# Patient Record
Sex: FEMALE | Race: WHITE | NOT HISPANIC OR LATINO
[De-identification: names, ages, dates, MRNs, and addresses within clinical notes are randomized per-mention and may not be internally consistent; named-entity substitution may affect disease eponyms.]

---

## 2017-01-18 ENCOUNTER — OTHER (OUTPATIENT)
Age: 62
End: 2017-01-18

## 2017-01-18 DIAGNOSIS — Z12.39 ENCOUNTER FOR OTHER SCREENING FOR MALIGNANT NEOPLASM OF BREAST: ICD-10-CM

## 2017-01-19 ENCOUNTER — FORM ENCOUNTER (OUTPATIENT)
Age: 62
End: 2017-01-19

## 2017-01-20 ENCOUNTER — OUTPATIENT (OUTPATIENT)
Dept: OUTPATIENT SERVICES | Facility: HOSPITAL | Age: 62
LOS: 1 days | End: 2017-01-20
Payer: MEDICAID

## 2017-01-20 PROCEDURE — 73560 X-RAY EXAM OF KNEE 1 OR 2: CPT | Mod: 26,LT

## 2017-01-20 PROCEDURE — 73560 X-RAY EXAM OF KNEE 1 OR 2: CPT

## 2017-01-24 ENCOUNTER — APPOINTMENT (OUTPATIENT)
Dept: INTERNAL MEDICINE | Facility: CLINIC | Age: 62
End: 2017-01-24

## 2017-01-24 VITALS
WEIGHT: 293 LBS | TEMPERATURE: 97.8 F | BODY MASS INDEX: 61.28 KG/M2 | DIASTOLIC BLOOD PRESSURE: 67 MMHG | SYSTOLIC BLOOD PRESSURE: 137 MMHG | HEART RATE: 76 BPM

## 2017-01-25 ENCOUNTER — RESULT REVIEW (OUTPATIENT)
Age: 62
End: 2017-01-25

## 2017-01-25 LAB
CHOLEST SERPL-MCNC: 198 MG/DL
CHOLEST/HDLC SERPL: 3.7 RATIO
HBA1C MFR BLD HPLC: 5.6 %
HDLC SERPL-MCNC: 54 MG/DL
LDLC SERPL CALC-MCNC: 125 MG/DL
TRIGL SERPL-MCNC: 93 MG/DL

## 2017-02-06 ENCOUNTER — APPOINTMENT (OUTPATIENT)
Dept: HEART AND VASCULAR | Facility: CLINIC | Age: 62
End: 2017-02-06

## 2017-02-06 VITALS — SYSTOLIC BLOOD PRESSURE: 160 MMHG | DIASTOLIC BLOOD PRESSURE: 100 MMHG

## 2017-02-06 VITALS
WEIGHT: 293 LBS | BODY MASS INDEX: 50.02 KG/M2 | SYSTOLIC BLOOD PRESSURE: 170 MMHG | HEART RATE: 76 BPM | HEIGHT: 64 IN | DIASTOLIC BLOOD PRESSURE: 100 MMHG

## 2017-02-09 LAB
ALBUMIN SERPL ELPH-MCNC: 4.2 G/DL
ALP BLD-CCNC: 96 U/L
ALT SERPL-CCNC: 25 U/L
ANION GAP SERPL CALC-SCNC: 19 MMOL/L
APPEARANCE: CLEAR
AST SERPL-CCNC: 18 U/L
BACTERIA: ABNORMAL
BASOPHILS # BLD AUTO: 0.04 K/UL
BASOPHILS NFR BLD AUTO: 0.5 %
BILIRUB SERPL-MCNC: 0.5 MG/DL
BILIRUBIN URINE: NEGATIVE
BLOOD URINE: NEGATIVE
BUN SERPL-MCNC: 17 MG/DL
CALCIUM OXALATE CRYSTALS: ABNORMAL
CALCIUM SERPL-MCNC: 9.8 MG/DL
CHLORIDE SERPL-SCNC: 105 MMOL/L
CO2 SERPL-SCNC: 20 MMOL/L
COLOR: ABNORMAL
CREAT SERPL-MCNC: 0.78 MG/DL
CREAT SPEC-SCNC: 499 MG/DL
EOSINOPHIL # BLD AUTO: 0.29 K/UL
EOSINOPHIL NFR BLD AUTO: 3.7 %
GLUCOSE QUALITATIVE U: NORMAL MG/DL
GLUCOSE SERPL-MCNC: 89 MG/DL
HCT VFR BLD CALC: 45.6 %
HGB BLD-MCNC: 14.5 G/DL
HYALINE CASTS: 0 /LPF
IMM GRANULOCYTES NFR BLD AUTO: 0.1 %
KETONES URINE: ABNORMAL
LEUKOCYTE ESTERASE URINE: NEGATIVE
LYMPHOCYTES # BLD AUTO: 1.84 K/UL
LYMPHOCYTES NFR BLD AUTO: 23.7 %
MAGNESIUM SERPL-MCNC: 2.2 MG/DL
MAN DIFF?: NORMAL
MCHC RBC-ENTMCNC: 29.8 PG
MCHC RBC-ENTMCNC: 31.8 GM/DL
MCV RBC AUTO: 93.8 FL
MICROALBUMIN 24H UR DL<=1MG/L-MCNC: 9 MG/DL
MICROALBUMIN/CREAT 24H UR-RTO: 18 UG/MG
MICROSCOPIC-UA: NORMAL
MONOCYTES # BLD AUTO: 0.4 K/UL
MONOCYTES NFR BLD AUTO: 5.1 %
NEUTROPHILS # BLD AUTO: 5.2 K/UL
NEUTROPHILS NFR BLD AUTO: 66.9 %
NITRITE URINE: NEGATIVE
PH URINE: 5.5
PLATELET # BLD AUTO: 251 K/UL
POTASSIUM SERPL-SCNC: 3.9 MMOL/L
PROT SERPL-MCNC: 7.3 G/DL
PROTEIN URINE: 30 MG/DL
RBC # BLD: 4.86 M/UL
RBC # FLD: 14.5 %
RED BLOOD CELLS URINE: 3 /HPF
SODIUM SERPL-SCNC: 144 MMOL/L
SPECIFIC GRAVITY URINE: 1.04
SQUAMOUS EPITHELIAL CELLS: 17 /HPF
TSH SERPL-ACNC: 4.03 UIU/ML
URINE COMMENTS: NORMAL
UROBILINOGEN URINE: 1 MG/DL
WBC # FLD AUTO: 7.78 K/UL
WHITE BLOOD CELLS URINE: 3 /HPF

## 2017-02-26 ENCOUNTER — FORM ENCOUNTER (OUTPATIENT)
Age: 62
End: 2017-02-26

## 2017-02-27 ENCOUNTER — OUTPATIENT (OUTPATIENT)
Dept: OUTPATIENT SERVICES | Facility: HOSPITAL | Age: 62
LOS: 1 days | End: 2017-02-27
Payer: MEDICAID

## 2017-02-27 PROCEDURE — 77067 SCR MAMMO BI INCL CAD: CPT

## 2017-02-27 PROCEDURE — G0202: CPT | Mod: 26

## 2017-03-03 ENCOUNTER — OUTPATIENT (OUTPATIENT)
Dept: OUTPATIENT SERVICES | Facility: HOSPITAL | Age: 62
LOS: 1 days | End: 2017-03-03
Payer: MEDICAID

## 2017-03-03 DIAGNOSIS — R55 SYNCOPE AND COLLAPSE: ICD-10-CM

## 2017-03-03 PROCEDURE — 93306 TTE W/DOPPLER COMPLETE: CPT | Mod: 26

## 2017-03-03 PROCEDURE — 93306 TTE W/DOPPLER COMPLETE: CPT

## 2017-03-06 ENCOUNTER — APPOINTMENT (OUTPATIENT)
Dept: HEART AND VASCULAR | Facility: CLINIC | Age: 62
End: 2017-03-06

## 2017-03-06 VITALS
WEIGHT: 293 LBS | BODY MASS INDEX: 50.02 KG/M2 | HEIGHT: 64 IN | SYSTOLIC BLOOD PRESSURE: 140 MMHG | DIASTOLIC BLOOD PRESSURE: 80 MMHG

## 2017-03-07 DIAGNOSIS — Z80.3 FAMILY HISTORY OF MALIGNANT NEOPLASM OF BREAST: ICD-10-CM

## 2017-03-07 DIAGNOSIS — Z12.31 ENCOUNTER FOR SCREENING MAMMOGRAM FOR MALIGNANT NEOPLASM OF BREAST: ICD-10-CM

## 2017-03-22 ENCOUNTER — APPOINTMENT (OUTPATIENT)
Dept: INTERNAL MEDICINE | Facility: CLINIC | Age: 62
End: 2017-03-22

## 2017-03-23 LAB
MEV IGG FLD QL IA: >300 AU/ML
MEV IGG+IGM SER-IMP: POSITIVE
MUV AB SER-ACNC: POSITIVE
MUV IGG SER QL IA: >300 AU/ML

## 2017-03-24 ENCOUNTER — APPOINTMENT (OUTPATIENT)
Dept: INTERNAL MEDICINE | Facility: CLINIC | Age: 62
End: 2017-03-24

## 2017-03-24 LAB
RUBV IGG FLD-ACNC: 22.4 INDEX
RUBV IGG SER-IMP: POSITIVE

## 2017-03-27 ENCOUNTER — APPOINTMENT (OUTPATIENT)
Dept: HEART AND VASCULAR | Facility: CLINIC | Age: 62
End: 2017-03-27

## 2017-03-27 VITALS
HEIGHT: 64 IN | SYSTOLIC BLOOD PRESSURE: 132 MMHG | HEART RATE: 69 BPM | WEIGHT: 293 LBS | BODY MASS INDEX: 50.02 KG/M2 | DIASTOLIC BLOOD PRESSURE: 80 MMHG

## 2017-03-27 VITALS — DIASTOLIC BLOOD PRESSURE: 88 MMHG | HEART RATE: 62 BPM | SYSTOLIC BLOOD PRESSURE: 190 MMHG

## 2017-03-27 LAB
AMPHET UR-MCNC: NEGATIVE
BARBITURATES UR-MCNC: NEGATIVE
BENZODIAZ UR-MCNC: NEGATIVE
COCAINE METAB.OTHER UR-MCNC: NEGATIVE
CREATININE, URINE: >200 MG/DL
METHADONE UR-MCNC: NEGATIVE
METHAQUALONE UR-MCNC: NEGATIVE
OPIATES UR-MCNC: NEGATIVE
PCP UR-MCNC: NEGATIVE
PROPOXYPH UR QL: NEGATIVE
THC UR QL: NEGATIVE

## 2017-03-30 ENCOUNTER — APPOINTMENT (OUTPATIENT)
Dept: HEART AND VASCULAR | Facility: CLINIC | Age: 62
End: 2017-03-30

## 2017-03-30 VITALS
HEART RATE: 69 BPM | DIASTOLIC BLOOD PRESSURE: 82 MMHG | BODY MASS INDEX: 50.02 KG/M2 | HEIGHT: 64 IN | SYSTOLIC BLOOD PRESSURE: 122 MMHG | WEIGHT: 293 LBS

## 2017-04-03 ENCOUNTER — APPOINTMENT (OUTPATIENT)
Dept: INTERNAL MEDICINE | Facility: CLINIC | Age: 62
End: 2017-04-03

## 2017-04-03 VITALS — DIASTOLIC BLOOD PRESSURE: 82 MMHG | SYSTOLIC BLOOD PRESSURE: 124 MMHG

## 2017-04-03 VITALS — OXYGEN SATURATION: 97 % | BODY MASS INDEX: 58.02 KG/M2 | WEIGHT: 293 LBS | HEART RATE: 77 BPM

## 2017-04-03 DIAGNOSIS — Z76.89 PERSONS ENCOUNTERING HEALTH SERVICES IN OTHER SPECIFIED CIRCUMSTANCES: ICD-10-CM

## 2017-04-03 LAB — GLUCOSE BLDC GLUCOMTR-MCNC: 85

## 2017-04-04 LAB
CHOLEST SERPL-MCNC: 170 MG/DL
CHOLEST/HDLC SERPL: 3.3 RATIO
HDLC SERPL-MCNC: 52 MG/DL
LDLC SERPL CALC-MCNC: 97 MG/DL
TRIGL SERPL-MCNC: 107 MG/DL

## 2017-05-09 ENCOUNTER — APPOINTMENT (OUTPATIENT)
Dept: HEART AND VASCULAR | Facility: CLINIC | Age: 62
End: 2017-05-09

## 2017-05-10 ENCOUNTER — APPOINTMENT (OUTPATIENT)
Dept: INTERNAL MEDICINE | Facility: CLINIC | Age: 62
End: 2017-05-10

## 2017-05-12 ENCOUNTER — APPOINTMENT (OUTPATIENT)
Dept: ENDOCRINOLOGY | Facility: CLINIC | Age: 62
End: 2017-05-12

## 2017-05-24 ENCOUNTER — OUTPATIENT (OUTPATIENT)
Dept: OUTPATIENT SERVICES | Facility: HOSPITAL | Age: 62
LOS: 1 days | End: 2017-05-24
Payer: MEDICAID

## 2017-05-24 PROCEDURE — 75574 CT ANGIO HRT W/3D IMAGE: CPT | Mod: 26

## 2017-05-24 PROCEDURE — 75574 CT ANGIO HRT W/3D IMAGE: CPT

## 2017-06-06 ENCOUNTER — APPOINTMENT (OUTPATIENT)
Dept: INTERNAL MEDICINE | Facility: CLINIC | Age: 62
End: 2017-06-06

## 2017-06-07 ENCOUNTER — APPOINTMENT (OUTPATIENT)
Dept: NEUROLOGY | Facility: CLINIC | Age: 62
End: 2017-06-07

## 2017-06-07 VITALS
HEART RATE: 73 BPM | TEMPERATURE: 98.4 F | DIASTOLIC BLOOD PRESSURE: 89 MMHG | HEIGHT: 63.5 IN | OXYGEN SATURATION: 94 % | BODY MASS INDEX: 51.27 KG/M2 | SYSTOLIC BLOOD PRESSURE: 139 MMHG | WEIGHT: 293 LBS

## 2017-06-07 DIAGNOSIS — Z87.891 PERSONAL HISTORY OF NICOTINE DEPENDENCE: ICD-10-CM

## 2017-06-08 LAB
BASOPHILS # BLD AUTO: 0.03 K/UL
BASOPHILS NFR BLD AUTO: 0.4 %
EOSINOPHIL # BLD AUTO: 0.29 K/UL
EOSINOPHIL NFR BLD AUTO: 4 %
HCT VFR BLD CALC: 46.6 %
HGB BLD-MCNC: 14.8 G/DL
IMM GRANULOCYTES NFR BLD AUTO: 0.1 %
LYMPHOCYTES # BLD AUTO: 2.06 K/UL
LYMPHOCYTES NFR BLD AUTO: 28.1 %
MAN DIFF?: NORMAL
MCHC RBC-ENTMCNC: 30.2 PG
MCHC RBC-ENTMCNC: 31.8 GM/DL
MCV RBC AUTO: 95.1 FL
MONOCYTES # BLD AUTO: 0.35 K/UL
MONOCYTES NFR BLD AUTO: 4.8 %
NEUTROPHILS # BLD AUTO: 4.6 K/UL
NEUTROPHILS NFR BLD AUTO: 62.6 %
PLATELET # BLD AUTO: 235 K/UL
RBC # BLD: 4.9 M/UL
RBC # FLD: 14.3 %
TSH SERPL-ACNC: 4.51 UIU/ML
VIT B12 SERPL-MCNC: 1603 PG/ML
WBC # FLD AUTO: 7.34 K/UL

## 2017-06-13 ENCOUNTER — APPOINTMENT (OUTPATIENT)
Dept: ENDOCRINOLOGY | Facility: CLINIC | Age: 62
End: 2017-06-13

## 2017-06-14 ENCOUNTER — APPOINTMENT (OUTPATIENT)
Dept: NEUROLOGY | Facility: CLINIC | Age: 62
End: 2017-06-14

## 2017-06-19 ENCOUNTER — APPOINTMENT (OUTPATIENT)
Dept: INTERNAL MEDICINE | Facility: CLINIC | Age: 62
End: 2017-06-19

## 2017-06-19 VITALS
OXYGEN SATURATION: 97 % | SYSTOLIC BLOOD PRESSURE: 119 MMHG | HEART RATE: 80 BPM | HEIGHT: 63.5 IN | BODY MASS INDEX: 51.27 KG/M2 | WEIGHT: 293 LBS | TEMPERATURE: 97.8 F | DIASTOLIC BLOOD PRESSURE: 75 MMHG

## 2017-06-20 ENCOUNTER — APPOINTMENT (OUTPATIENT)
Dept: NEUROLOGY | Facility: CLINIC | Age: 62
End: 2017-06-20

## 2017-06-22 ENCOUNTER — APPOINTMENT (OUTPATIENT)
Dept: NEUROLOGY | Facility: CLINIC | Age: 62
End: 2017-06-22

## 2017-06-22 LAB
HBA1C MFR BLD HPLC: 5.4 %
T4 FREE SERPL-MCNC: 1.2 NG/DL

## 2017-07-17 ENCOUNTER — APPOINTMENT (OUTPATIENT)
Dept: GASTROENTEROLOGY | Facility: CLINIC | Age: 62
End: 2017-07-17

## 2017-07-17 ENCOUNTER — FORM ENCOUNTER (OUTPATIENT)
Age: 62
End: 2017-07-17

## 2017-07-18 ENCOUNTER — APPOINTMENT (OUTPATIENT)
Dept: ORTHOPEDIC SURGERY | Facility: CLINIC | Age: 62
End: 2017-07-18

## 2017-07-18 ENCOUNTER — OUTPATIENT (OUTPATIENT)
Dept: OUTPATIENT SERVICES | Facility: HOSPITAL | Age: 62
LOS: 1 days | End: 2017-07-18
Payer: MEDICAID

## 2017-07-18 VITALS
DIASTOLIC BLOOD PRESSURE: 80 MMHG | BODY MASS INDEX: 51.27 KG/M2 | SYSTOLIC BLOOD PRESSURE: 120 MMHG | WEIGHT: 293 LBS | HEIGHT: 63.5 IN

## 2017-07-18 DIAGNOSIS — Z80.1 FAMILY HISTORY OF MALIGNANT NEOPLASM OF TRACHEA, BRONCHUS AND LUNG: ICD-10-CM

## 2017-07-18 DIAGNOSIS — Z80.3 FAMILY HISTORY OF MALIGNANT NEOPLASM OF BREAST: ICD-10-CM

## 2017-07-18 DIAGNOSIS — Z82.62 FAMILY HISTORY OF OSTEOPOROSIS: ICD-10-CM

## 2017-07-18 DIAGNOSIS — Z82.49 FAMILY HISTORY OF ISCHEMIC HEART DISEASE AND OTHER DISEASES OF THE CIRCULATORY SYSTEM: ICD-10-CM

## 2017-07-18 PROCEDURE — 72100 X-RAY EXAM L-S SPINE 2/3 VWS: CPT

## 2017-07-18 PROCEDURE — 72082 X-RAY EXAM ENTIRE SPI 2/3 VW: CPT | Mod: 26

## 2017-07-18 PROCEDURE — 72082 X-RAY EXAM ENTIRE SPI 2/3 VW: CPT

## 2017-07-21 ENCOUNTER — APPOINTMENT (OUTPATIENT)
Dept: NEUROLOGY | Facility: CLINIC | Age: 62
End: 2017-07-21

## 2017-07-21 VITALS
SYSTOLIC BLOOD PRESSURE: 120 MMHG | HEART RATE: 79 BPM | DIASTOLIC BLOOD PRESSURE: 76 MMHG | OXYGEN SATURATION: 96 % | TEMPERATURE: 98.2 F

## 2017-07-21 VITALS — BODY MASS INDEX: 51.27 KG/M2 | WEIGHT: 293 LBS | HEIGHT: 63.5 IN

## 2017-07-24 ENCOUNTER — RX RENEWAL (OUTPATIENT)
Age: 62
End: 2017-07-24

## 2017-07-26 ENCOUNTER — RX RENEWAL (OUTPATIENT)
Age: 62
End: 2017-07-26

## 2017-08-16 ENCOUNTER — FORM ENCOUNTER (OUTPATIENT)
Age: 62
End: 2017-08-16

## 2017-08-17 ENCOUNTER — APPOINTMENT (OUTPATIENT)
Dept: ORTHOPEDIC SURGERY | Facility: CLINIC | Age: 62
End: 2017-08-17
Payer: MEDICAID

## 2017-08-17 ENCOUNTER — OUTPATIENT (OUTPATIENT)
Dept: OUTPATIENT SERVICES | Facility: HOSPITAL | Age: 62
LOS: 1 days | End: 2017-08-17
Payer: MEDICAID

## 2017-08-17 VITALS — WEIGHT: 293 LBS | BODY MASS INDEX: 51.27 KG/M2 | HEIGHT: 63.5 IN

## 2017-08-17 DIAGNOSIS — E66.9 OBESITY, UNSPECIFIED: ICD-10-CM

## 2017-08-17 DIAGNOSIS — M16.11 UNILATERAL PRIMARY OSTEOARTHRITIS, RIGHT HIP: ICD-10-CM

## 2017-08-17 PROCEDURE — 73502 X-RAY EXAM HIP UNI 2-3 VIEWS: CPT | Mod: 26,RT

## 2017-08-17 PROCEDURE — 99215 OFFICE O/P EST HI 40 MIN: CPT

## 2017-08-17 PROCEDURE — 72020 X-RAY EXAM OF SPINE 1 VIEW: CPT

## 2017-08-17 PROCEDURE — 72020 X-RAY EXAM OF SPINE 1 VIEW: CPT | Mod: 26

## 2017-08-17 PROCEDURE — 73502 X-RAY EXAM HIP UNI 2-3 VIEWS: CPT

## 2017-08-20 ENCOUNTER — FORM ENCOUNTER (OUTPATIENT)
Age: 62
End: 2017-08-20

## 2017-08-21 ENCOUNTER — OUTPATIENT (OUTPATIENT)
Dept: OUTPATIENT SERVICES | Facility: HOSPITAL | Age: 62
LOS: 1 days | End: 2017-08-21
Payer: MEDICAID

## 2017-08-21 PROCEDURE — 93880 EXTRACRANIAL BILAT STUDY: CPT

## 2017-08-21 PROCEDURE — 93880 EXTRACRANIAL BILAT STUDY: CPT | Mod: 26

## 2017-08-25 ENCOUNTER — OTHER (OUTPATIENT)
Age: 62
End: 2017-08-25

## 2017-09-01 ENCOUNTER — APPOINTMENT (OUTPATIENT)
Dept: ENDOCRINOLOGY | Facility: CLINIC | Age: 62
End: 2017-09-01
Payer: MEDICAID

## 2017-09-18 ENCOUNTER — APPOINTMENT (OUTPATIENT)
Dept: NEUROLOGY | Facility: CLINIC | Age: 62
End: 2017-09-18
Payer: MEDICAID

## 2017-09-18 VITALS
HEIGHT: 63.5 IN | WEIGHT: 293 LBS | TEMPERATURE: 98.1 F | BODY MASS INDEX: 51.27 KG/M2 | SYSTOLIC BLOOD PRESSURE: 143 MMHG | HEART RATE: 74 BPM | OXYGEN SATURATION: 94 % | DIASTOLIC BLOOD PRESSURE: 95 MMHG

## 2017-09-18 DIAGNOSIS — Z87.898 PERSONAL HISTORY OF OTHER SPECIFIED CONDITIONS: ICD-10-CM

## 2017-09-18 DIAGNOSIS — R55 SYNCOPE AND COLLAPSE: ICD-10-CM

## 2017-09-18 PROCEDURE — 99214 OFFICE O/P EST MOD 30 MIN: CPT

## 2017-09-20 ENCOUNTER — APPOINTMENT (OUTPATIENT)
Dept: ENDOCRINOLOGY | Facility: CLINIC | Age: 62
End: 2017-09-20
Payer: MEDICAID

## 2017-09-20 VITALS — WEIGHT: 293 LBS | BODY MASS INDEX: 51.27 KG/M2 | HEIGHT: 63.5 IN

## 2017-09-20 DIAGNOSIS — G47.33 OBSTRUCTIVE SLEEP APNEA (ADULT) (PEDIATRIC): ICD-10-CM

## 2017-09-20 DIAGNOSIS — Z86.59 PERSONAL HISTORY OF OTHER MENTAL AND BEHAVIORAL DISORDERS: ICD-10-CM

## 2017-09-20 DIAGNOSIS — E78.5 HYPERLIPIDEMIA, UNSPECIFIED: ICD-10-CM

## 2017-09-20 DIAGNOSIS — R73.09 OTHER ABNORMAL GLUCOSE: ICD-10-CM

## 2017-09-20 PROCEDURE — 99205 OFFICE O/P NEW HI 60 MIN: CPT | Mod: GC

## 2017-09-25 ENCOUNTER — APPOINTMENT (OUTPATIENT)
Dept: GASTROENTEROLOGY | Facility: CLINIC | Age: 62
End: 2017-09-25

## 2017-09-28 ENCOUNTER — APPOINTMENT (OUTPATIENT)
Dept: HEART AND VASCULAR | Facility: CLINIC | Age: 62
End: 2017-09-28
Payer: MEDICAID

## 2017-09-28 VITALS
HEART RATE: 70 BPM | SYSTOLIC BLOOD PRESSURE: 139 MMHG | DIASTOLIC BLOOD PRESSURE: 80 MMHG | BODY MASS INDEX: 53.18 KG/M2 | WEIGHT: 293 LBS

## 2017-09-28 VITALS — DIASTOLIC BLOOD PRESSURE: 85 MMHG | SYSTOLIC BLOOD PRESSURE: 135 MMHG | HEART RATE: 72 BPM

## 2017-09-28 PROCEDURE — 99214 OFFICE O/P EST MOD 30 MIN: CPT

## 2017-10-03 LAB
25(OH)D3 SERPL-MCNC: 62.9 NG/ML
ALBUMIN SERPL ELPH-MCNC: 4.4 G/DL
ALP BLD-CCNC: 113 U/L
ALT SERPL-CCNC: 26 U/L
ANION GAP SERPL CALC-SCNC: 19 MMOL/L
AST SERPL-CCNC: 22 U/L
BILIRUB SERPL-MCNC: 1.1 MG/DL
BUN SERPL-MCNC: 15 MG/DL
C PEPTIDE SERPL-MCNC: 4.8 NG/ML
CALCIUM SERPL-MCNC: 10.5 MG/DL
CHLORIDE SERPL-SCNC: 103 MMOL/L
CHOLEST SERPL-MCNC: 170 MG/DL
CHOLEST/HDLC SERPL: 3.1 RATIO
CO2 SERPL-SCNC: 22 MMOL/L
CREAT SERPL-MCNC: 1.05 MG/DL
GLUCOSE SERPL-MCNC: 87 MG/DL
HDLC SERPL-MCNC: 54 MG/DL
INSULIN SERPL-MCNC: 10.6 UU/ML
LDLC SERPL CALC-MCNC: 99 MG/DL
POTASSIUM SERPL-SCNC: 4.2 MMOL/L
PROINSULIN SERPL-MCNC: 6.3 PMOL/L
PROT SERPL-MCNC: 7.5 G/DL
SODIUM SERPL-SCNC: 144 MMOL/L
T3 SERPL-MCNC: 108 NG/DL
T4 FREE SERPL-MCNC: 1.5 NG/DL
THYROGLOB AB SERPL-ACNC: <20 IU/ML
THYROPEROXIDASE AB SERPL IA-ACNC: 59.9 IU/ML
TRIGL SERPL-MCNC: 87 MG/DL
TSH SERPL-ACNC: 3.78 UIU/ML

## 2017-10-16 ENCOUNTER — APPOINTMENT (OUTPATIENT)
Dept: SLEEP CENTER | Facility: HOSPITAL | Age: 62
End: 2017-10-16

## 2017-10-16 ENCOUNTER — APPOINTMENT (OUTPATIENT)
Dept: HEART AND VASCULAR | Facility: CLINIC | Age: 62
End: 2017-10-16

## 2017-10-19 ENCOUNTER — APPOINTMENT (OUTPATIENT)
Dept: INTERNAL MEDICINE | Facility: CLINIC | Age: 62
End: 2017-10-19

## 2017-10-31 ENCOUNTER — RX RENEWAL (OUTPATIENT)
Age: 62
End: 2017-10-31

## 2017-11-13 ENCOUNTER — APPOINTMENT (OUTPATIENT)
Dept: INTERNAL MEDICINE | Facility: CLINIC | Age: 62
End: 2017-11-13

## 2017-11-14 ENCOUNTER — APPOINTMENT (OUTPATIENT)
Dept: INTERNAL MEDICINE | Facility: CLINIC | Age: 62
End: 2017-11-14
Payer: MEDICAID

## 2017-11-14 VITALS
HEART RATE: 67 BPM | WEIGHT: 293 LBS | DIASTOLIC BLOOD PRESSURE: 87 MMHG | BODY MASS INDEX: 53.15 KG/M2 | TEMPERATURE: 98.5 F | SYSTOLIC BLOOD PRESSURE: 129 MMHG | OXYGEN SATURATION: 95 %

## 2017-11-14 DIAGNOSIS — M51.16 INTERVERTEBRAL DISC DISORDERS WITH RADICULOPATHY, LUMBAR REGION: ICD-10-CM

## 2017-11-14 DIAGNOSIS — E04.1 NONTOXIC SINGLE THYROID NODULE: ICD-10-CM

## 2017-11-14 PROCEDURE — 99214 OFFICE O/P EST MOD 30 MIN: CPT | Mod: 25,GC

## 2017-11-21 ENCOUNTER — RX RENEWAL (OUTPATIENT)
Age: 62
End: 2017-11-21

## 2017-11-22 ENCOUNTER — RX RENEWAL (OUTPATIENT)
Age: 62
End: 2017-11-22

## 2017-11-22 ENCOUNTER — OUTPATIENT (OUTPATIENT)
Dept: OUTPATIENT SERVICES | Facility: HOSPITAL | Age: 62
LOS: 1 days | End: 2017-11-22
Payer: MEDICAID

## 2017-11-22 PROCEDURE — 76536 US EXAM OF HEAD AND NECK: CPT | Mod: 26

## 2017-11-22 PROCEDURE — 76536 US EXAM OF HEAD AND NECK: CPT

## 2017-11-29 ENCOUNTER — APPOINTMENT (OUTPATIENT)
Dept: INTERNAL MEDICINE | Facility: CLINIC | Age: 62
End: 2017-11-29
Payer: COMMERCIAL

## 2017-11-29 VITALS
TEMPERATURE: 98.4 F | DIASTOLIC BLOOD PRESSURE: 77 MMHG | SYSTOLIC BLOOD PRESSURE: 119 MMHG | WEIGHT: 293 LBS | BODY MASS INDEX: 53.32 KG/M2 | OXYGEN SATURATION: 94 % | HEART RATE: 74 BPM

## 2017-11-29 DIAGNOSIS — I25.10 ATHEROSCLEROTIC HEART DISEASE OF NATIVE CORONARY ARTERY W/OUT ANGINA PECTORIS: ICD-10-CM

## 2017-11-29 DIAGNOSIS — Z02.89 ENCOUNTER FOR OTHER ADMINISTRATIVE EXAMINATIONS: ICD-10-CM

## 2017-11-29 PROCEDURE — 99213 OFFICE O/P EST LOW 20 MIN: CPT | Mod: GC

## 2017-12-21 ENCOUNTER — APPOINTMENT (OUTPATIENT)
Dept: INTERNAL MEDICINE | Facility: CLINIC | Age: 62
End: 2017-12-21

## 2018-01-12 ENCOUNTER — OTHER (OUTPATIENT)
Age: 63
End: 2018-01-12

## 2018-02-06 ENCOUNTER — RX RENEWAL (OUTPATIENT)
Age: 63
End: 2018-02-06

## 2018-03-06 ENCOUNTER — RX RENEWAL (OUTPATIENT)
Age: 63
End: 2018-03-06

## 2018-03-07 ENCOUNTER — APPOINTMENT (OUTPATIENT)
Dept: INTERNAL MEDICINE | Facility: CLINIC | Age: 63
End: 2018-03-07

## 2018-05-02 ENCOUNTER — APPOINTMENT (OUTPATIENT)
Dept: INTERNAL MEDICINE | Facility: CLINIC | Age: 63
End: 2018-05-02

## 2018-06-11 ENCOUNTER — APPOINTMENT (OUTPATIENT)
Dept: NEUROLOGY | Facility: CLINIC | Age: 63
End: 2018-06-11

## 2018-07-17 ENCOUNTER — RX CHANGE (OUTPATIENT)
Age: 63
End: 2018-07-17

## 2018-08-03 ENCOUNTER — RX RENEWAL (OUTPATIENT)
Age: 63
End: 2018-08-03

## 2018-08-09 ENCOUNTER — APPOINTMENT (OUTPATIENT)
Dept: HEART AND VASCULAR | Facility: CLINIC | Age: 63
End: 2018-08-09

## 2018-08-20 ENCOUNTER — APPOINTMENT (OUTPATIENT)
Dept: INTERNAL MEDICINE | Facility: CLINIC | Age: 63
End: 2018-08-20

## 2018-09-17 ENCOUNTER — APPOINTMENT (OUTPATIENT)
Dept: HEART AND VASCULAR | Facility: CLINIC | Age: 63
End: 2018-09-17
Payer: COMMERCIAL

## 2018-09-17 VITALS
WEIGHT: 293 LBS | HEIGHT: 63 IN | SYSTOLIC BLOOD PRESSURE: 110 MMHG | BODY MASS INDEX: 51.91 KG/M2 | HEART RATE: 77 BPM | DIASTOLIC BLOOD PRESSURE: 78 MMHG

## 2018-09-17 PROCEDURE — 93000 ELECTROCARDIOGRAM COMPLETE: CPT

## 2018-09-17 PROCEDURE — 99215 OFFICE O/P EST HI 40 MIN: CPT | Mod: 25

## 2018-09-17 PROCEDURE — 81005 URINALYSIS: CPT

## 2018-09-17 PROCEDURE — 82043 UR ALBUMIN QUANTITATIVE: CPT | Mod: QW

## 2018-09-17 PROCEDURE — 36415 COLL VENOUS BLD VENIPUNCTURE: CPT

## 2018-09-18 ENCOUNTER — APPOINTMENT (OUTPATIENT)
Dept: INTERNAL MEDICINE | Facility: CLINIC | Age: 63
End: 2018-09-18

## 2018-09-19 LAB
ALBUMIN SERPL ELPH-MCNC: 4.5 G/DL
ALP BLD-CCNC: 119 U/L
ALT SERPL-CCNC: 22 U/L
ANION GAP SERPL CALC-SCNC: 15 MMOL/L
AST SERPL-CCNC: 19 U/L
BASOPHILS # BLD AUTO: 0.05 K/UL
BASOPHILS NFR BLD AUTO: 0.9 %
BILIRUB SERPL-MCNC: 0.8 MG/DL
BUN SERPL-MCNC: 20 MG/DL
CALCIUM SERPL-MCNC: 10.5 MG/DL
CHLORIDE SERPL-SCNC: 105 MMOL/L
CHOLEST SERPL-MCNC: 144 MG/DL
CHOLEST/HDLC SERPL: 2.7 RATIO
CO2 SERPL-SCNC: 26 MMOL/L
CREAT SERPL-MCNC: 1.09 MG/DL
EOSINOPHIL # BLD AUTO: 0.27 K/UL
EOSINOPHIL NFR BLD AUTO: 4.6 %
GLUCOSE SERPL-MCNC: 106 MG/DL
HBA1C MFR BLD HPLC: 5.4 %
HCT VFR BLD CALC: 46.8 %
HDLC SERPL-MCNC: 54 MG/DL
HGB BLD-MCNC: 15 G/DL
IMM GRANULOCYTES NFR BLD AUTO: 0.3 %
LDLC SERPL CALC-MCNC: 71 MG/DL
LYMPHOCYTES # BLD AUTO: 1.38 K/UL
LYMPHOCYTES NFR BLD AUTO: 23.5 %
MAGNESIUM SERPL-MCNC: 2.3 MG/DL
MAN DIFF?: NORMAL
MCHC RBC-ENTMCNC: 29.8 PG
MCHC RBC-ENTMCNC: 32.1 GM/DL
MCV RBC AUTO: 93 FL
MONOCYTES # BLD AUTO: 0.32 K/UL
MONOCYTES NFR BLD AUTO: 5.4 %
NEUTROPHILS # BLD AUTO: 3.84 K/UL
NEUTROPHILS NFR BLD AUTO: 65.3 %
PLATELET # BLD AUTO: 260 K/UL
POTASSIUM SERPL-SCNC: 4.5 MMOL/L
PROT SERPL-MCNC: 7.6 G/DL
RBC # BLD: 5.03 M/UL
RBC # FLD: 14 %
SODIUM SERPL-SCNC: 146 MMOL/L
TRIGL SERPL-MCNC: 96 MG/DL
TSH SERPL-ACNC: 5.99 UIU/ML
WBC # FLD AUTO: 5.88 K/UL

## 2018-10-12 ENCOUNTER — RX RENEWAL (OUTPATIENT)
Age: 63
End: 2018-10-12

## 2018-10-15 ENCOUNTER — RX RENEWAL (OUTPATIENT)
Age: 63
End: 2018-10-15

## 2018-11-14 ENCOUNTER — RX RENEWAL (OUTPATIENT)
Age: 63
End: 2018-11-14

## 2018-11-15 ENCOUNTER — APPOINTMENT (OUTPATIENT)
Dept: INTERNAL MEDICINE | Facility: CLINIC | Age: 63
End: 2018-11-15

## 2018-11-20 ENCOUNTER — APPOINTMENT (OUTPATIENT)
Dept: INTERNAL MEDICINE | Facility: CLINIC | Age: 63
End: 2018-11-20
Payer: COMMERCIAL

## 2018-11-20 VITALS
SYSTOLIC BLOOD PRESSURE: 140 MMHG | DIASTOLIC BLOOD PRESSURE: 80 MMHG | RESPIRATION RATE: 14 BRPM | BODY MASS INDEX: 51.91 KG/M2 | HEIGHT: 63 IN | HEART RATE: 80 BPM | WEIGHT: 293 LBS

## 2018-11-20 DIAGNOSIS — E66.01 MORBID (SEVERE) OBESITY DUE TO EXCESS CALORIES: ICD-10-CM

## 2018-11-20 DIAGNOSIS — R21 RASH AND OTHER NONSPECIFIC SKIN ERUPTION: ICD-10-CM

## 2018-11-20 DIAGNOSIS — F41.9 ANXIETY DISORDER, UNSPECIFIED: ICD-10-CM

## 2018-11-20 DIAGNOSIS — I10 ESSENTIAL (PRIMARY) HYPERTENSION: ICD-10-CM

## 2018-11-20 DIAGNOSIS — M25.562 PAIN IN LEFT KNEE: ICD-10-CM

## 2018-11-20 DIAGNOSIS — R79.89 OTHER SPECIFIED ABNORMAL FINDINGS OF BLOOD CHEMISTRY: ICD-10-CM

## 2018-11-20 DIAGNOSIS — H57.10 OCULAR PAIN, UNSPECIFIED EYE: ICD-10-CM

## 2018-11-20 DIAGNOSIS — F32.9 ANXIETY DISORDER, UNSPECIFIED: ICD-10-CM

## 2018-11-20 PROCEDURE — 36415 COLL VENOUS BLD VENIPUNCTURE: CPT

## 2018-11-20 PROCEDURE — 99214 OFFICE O/P EST MOD 30 MIN: CPT | Mod: GC,25

## 2018-11-20 RX ORDER — CHLORZOXAZONE 500 MG/1
500 TABLET ORAL
Qty: 30 | Refills: 0 | Status: DISCONTINUED | COMMUNITY
End: 2018-11-20

## 2018-11-21 ENCOUNTER — RESULT REVIEW (OUTPATIENT)
Age: 63
End: 2018-11-21

## 2018-11-21 PROBLEM — E66.01 OBESITY, MORBID (MORE THAN 100 LBS OVER IDEAL WEIGHT OR BMI > 40): Status: ACTIVE | Noted: 2017-09-20

## 2018-11-21 PROBLEM — M25.562 LEFT KNEE PAIN, UNSPECIFIED CHRONICITY: Status: ACTIVE | Noted: 2017-01-18

## 2018-11-21 PROBLEM — H57.10: Status: ACTIVE | Noted: 2018-08-03

## 2018-11-21 LAB
APPEARANCE: ABNORMAL
BACTERIA: NEGATIVE
BILIRUBIN URINE: ABNORMAL
BLOOD URINE: NEGATIVE
CALCIUM OXALATE CRYSTALS: ABNORMAL
COLOR: ABNORMAL
GLUCOSE QUALITATIVE U: NEGATIVE MG/DL
HYALINE CASTS: 0 /LPF
KETONES URINE: ABNORMAL
LEUKOCYTE ESTERASE URINE: NEGATIVE
MICROSCOPIC-UA: NORMAL
NITRITE URINE: NEGATIVE
PH URINE: 5.5
PROTEIN URINE: ABNORMAL MG/DL
RED BLOOD CELLS URINE: 0 /HPF
SPECIFIC GRAVITY URINE: 1.03
SQUAMOUS EPITHELIAL CELLS: 7 /HPF
T3 SERPL-MCNC: 112 NG/DL
T4 FREE SERPL-MCNC: 1.3 NG/DL
UROBILINOGEN URINE: NEGATIVE MG/DL
WHITE BLOOD CELLS URINE: 13 /HPF

## 2018-11-21 NOTE — ASSESSMENT
[FreeTextEntry1] : PMH of morbid obesity, HTN, HLD, CAD, prediabetes, thyroid nodule, who presents for complete physical exam\par \par 1. Creatinine elevation\par - Patient with elevated creatinine following twice daily use of NSAIDs\par - Discussed effect of NSAIDs on renal function and encouraged patient to use tylenol for general body pains\par - F/u creatinine at next visit\par - F/u UA\par \par 2. TSH elevation\par - Patient with normal T3/free T4 last year, seen by Dr. Haddad\par - With asymptomatic TSH elevation, will recheck T3/T4 at this visit\par \par 3. Skin rash\par - Patient with possible psoriasis vs fungal rash, involving elbows as well\par - Will trial steroid cream hydrocortisone 1%, can refer to dermatology if no improvement\par - Patient hesitant to use steroids\par \par 4. Eye problems\par - Patient reporting additional floaters, states that she "could see the macula" on her R eye\par - Refer to ophthalmology, will call [facility]\par - Less likely temporal arteritis as patient with no fever, no vision loss. Will f/u ESR\par - Patient reports that she had MRI, will obtain copy of report\par \par 5. Prediabetes\par - A1C last 5.4%, can recheck at next visit\par - Encourage diet and lifestyle modification\par - Continue to follow up with endocrinology\par - F/u UA\par \par 6. HTN\par - Order extra-large BP cuff / thigh cuff for patient\par \par 7. Depression\par - Patient will be set up with in-house psychologist Mona Hair for initial meeting today. Endorses depressed mood due to inability to participate in hobbies and activities due to medical comorbidity\par \par 8. HCM\par - Deferred in setting of multiple active medical problems\par - Patient refused flu shot\par - Will request RTC in 6 weeks to provide additional preventive care

## 2018-11-21 NOTE — HISTORY OF PRESENT ILLNESS
[de-identified] : Patient is a 63 year old female with PMH of morbid obesity, HTN, HLD, CAD, prediabetes, thyroid nodule, who presents for physical exam. She states that her cardiologist, Dr. Rivera, noted that she has elevated creatinine on outpatient labs - she took Ibuprofen heavily BID (unknown dose) a few months ago and switched to more occasional meloxicam PRN (approximately once per week). She takes this medication for diffuse pains throughout her body. She endorses eye problems with acute onset three months ago, when she felt like a layer of material was floating in her R eye, and she noticed "strings" of floaters. She has a R sided headache, and feels sinus pressure on the R; she is beginning to feel L sinus pressure as well. She has had recent dental work done with planing. In addition to this, she has had a rash on the back of her L hand which is improving over the past four months; she states that she picked up a book at the library and feels that this gave her a fungal infection. She has tried copper, almond oil, and collagen for the rash and it is helping somewhat. The rash is intermittently itchy, worse at night

## 2018-11-21 NOTE — REVIEW OF SYSTEMS
[Pain] : pain [Vision Problems] : vision problems [Joint Pain] : joint pain [Muscle Pain] : muscle pain [Skin Rash] : skin rash [Headache] : headache [Depression] : depression [Fever] : no fever [Night Sweats] : no night sweats [Recent Change In Weight] : ~T no recent weight change [Discharge] : no discharge [Earache] : no earache [Nasal Discharge] : no nasal discharge [Sore Throat] : no sore throat [Chest Pain] : no chest pain [Shortness Of Breath] : no shortness of breath [Cough] : no cough [Abdominal Pain] : no abdominal pain [Nausea] : no nausea [Constipation] : no constipation [Diarrhea] : diarrhea [Vomiting] : no vomiting [Dysuria] : no dysuria [Fainting] : no fainting [Anxiety] : no anxiety [Easy Bleeding] : no easy bleeding [de-identified] : no SI/HI

## 2018-11-21 NOTE — END OF VISIT
[] : Resident [FreeTextEntry3] : 63 year old female not seen in over one year with multiple somatic complaints mostly chronic- \par eye discomfort for three months with halo, most recently right sided headaches as well. Has awakened at times with discomfort but mostly end of day. Has not seen opthomologist in some time states he does not take her insurance.\par Chronic joint pain-unable to work\par Anxiety and depression- not on any pharmacological treatment- no support system\par morbid obesity with slow but steady decline over the last few years\par appears well but neglected in appearance, apathetic mood\par vitals, labs and chart reviewed\par exam as per resident- no focal findings\par hypertension- patient to obtain large cuff bp machine, return in two weeks with documentation and machine to ascertain fit and accuracy to meet with NP.\par Rash- fine patch of demarcated dry scaly skin- trial of low dose hydrocortisone to alternate with moisturizer\par apathy- check labs, introduce to behavioral therapist Mona - \par chronic pain- review radiological testing, known to ortho- consider PT \par eye discomfort- to schedule appointment at [facility] this week. \par headaches acute on chronic - will review past neuro notes  multifactorial etiology- \par health care maintenance and vaccines reviewed with patient

## 2018-11-21 NOTE — PHYSICAL EXAM
[No Acute Distress] : no acute distress [Well Nourished] : well nourished [Well Developed] : well developed [Well-Appearing] : well-appearing [Normal Sclera/Conjunctiva] : normal sclera/conjunctiva [PERRL] : pupils equal round and reactive to light [Normal Outer Ear/Nose] : the outer ears and nose were normal in appearance [Normal Oropharynx] : the oropharynx was normal [No JVD] : no jugular venous distention [No Respiratory Distress] : no respiratory distress  [Clear to Auscultation] : lungs were clear to auscultation bilaterally [Normal Rate] : normal rate  [Normal S1, S2] : normal S1 and S2 [No Edema] : there was no peripheral edema [Soft] : abdomen soft [Non Tender] : non-tender [Non-distended] : non-distended [Normal Supraclavicular Nodes] : no supraclavicular lymphadenopathy [Normal Anterior Cervical Nodes] : no anterior cervical lymphadenopathy [No CVA Tenderness] : no CVA  tenderness [No Spinal Tenderness] : no spinal tenderness [Grossly Normal Strength/Tone] : grossly normal strength/tone [Coordination Grossly Intact] : coordination grossly intact [No Focal Deficits] : no focal deficits [Normal Affect] : the affect was normal [Alert and Oriented x3] : oriented to person, place, and time [Normal Mood] : the mood was normal [Normal Insight/Judgement] : insight and judgment were intact [de-identified] : Circular rash on back of right hand, mild erythema and scaling, back of b/l elbows with mild scaling patches [de-identified] : Slow gait

## 2018-11-26 ENCOUNTER — APPOINTMENT (OUTPATIENT)
Dept: OPHTHALMOLOGY | Facility: CLINIC | Age: 63
End: 2018-11-26
Payer: COMMERCIAL

## 2018-11-26 DIAGNOSIS — H43.811 VITREOUS DEGENERATION, RIGHT EYE: ICD-10-CM

## 2018-11-26 DIAGNOSIS — H43.812 VITREOUS DEGENERATION, LEFT EYE: ICD-10-CM

## 2018-11-26 DIAGNOSIS — H25.13 AGE-RELATED NUCLEAR CATARACT, BILATERAL: ICD-10-CM

## 2018-11-26 PROCEDURE — 92002 INTRM OPH EXAM NEW PATIENT: CPT

## 2018-12-19 ENCOUNTER — APPOINTMENT (OUTPATIENT)
Dept: INTERNAL MEDICINE | Facility: CLINIC | Age: 63
End: 2018-12-19

## 2019-01-25 ENCOUNTER — APPOINTMENT (OUTPATIENT)
Dept: INTERNAL MEDICINE | Facility: CLINIC | Age: 64
End: 2019-01-25

## 2019-02-04 PROBLEM — M51.16 LUMBAR DISC HERNIATION WITH RADICULOPATHY: Status: ACTIVE | Noted: 2017-07-18

## 2019-03-19 ENCOUNTER — RX RENEWAL (OUTPATIENT)
Age: 64
End: 2019-03-19

## 2019-04-22 ENCOUNTER — APPOINTMENT (OUTPATIENT)
Dept: HEART AND VASCULAR | Facility: CLINIC | Age: 64
End: 2019-04-22
Payer: COMMERCIAL

## 2019-04-22 ENCOUNTER — NON-APPOINTMENT (OUTPATIENT)
Age: 64
End: 2019-04-22

## 2019-04-22 VITALS — HEIGHT: 63 IN | WEIGHT: 293 LBS | BODY MASS INDEX: 51.91 KG/M2

## 2019-04-22 PROCEDURE — 93000 ELECTROCARDIOGRAM COMPLETE: CPT

## 2019-04-22 PROCEDURE — 99213 OFFICE O/P EST LOW 20 MIN: CPT | Mod: 25

## 2019-04-22 NOTE — HISTORY OF PRESENT ILLNESS
[FreeTextEntry1] : 63 year old female with h/o obesity, prediabetes, htn, hl, cad, depression who presents for f/up today.\par Last seen \par \par Has had extensive w/up for presyncope. Seen by neuro and had \par REEG/AMB EEG- normal. Carotid duplex showed no hemodynamically significant stenosisMRI brain was unremarkable.\par \par Seen by EP who recommended leslee eval which she declined. Also placed holter/event monitor\par Holter/event:  avg HR 59, fluttering corresponded to pac's and short atrial runs\par \par CTA cor's : borderline ectasia of aortic root/asc aorta 3.9 cm, calcium score 614, 98%, LM normal, prox/mid LAD mild stenosis, distal LAD minimal stenosis, D1 normal, D2 minimal stenosis, LCx prox/mid mild stenosis, OM1 small patent, OM2 normal, RCA prox mild stenosis, mid/distal RCA minimal stenosis, RPDA/RPL mild stenosis, ef 50%\par \par \par Seen by Endo for thyroid nodule and hypoglycemia eval. \par \par Echo 3/17 with normal ef 55%, mild conc lvh, no significant valvular disease.\par \par \par She denies cp, edema, orthopnea, pnd, sob, palpitations. 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)\par prediabetic\par vertigo\par htn\par hl\par bronchitis\par \par \par ALL:\par codeine derivatives\par horse sera agents\par sulfadiazine\par \par \par SH:\par quit tobacco >10 years, had smoked while in 20's\par no etoh\par no drugs\par used to work as  (currently laid off)\par lives alone\par never  \par no children\par \par FH:\par mother -  72 - Rheumatic fever, MI's in 40's\par father -  80's - cabg 60's\par \par MEDS:\par asa 81 mg qd\par lipitor 80 mg qhs\par toprol xl 25 mg qd\par chlorzoxazone 500 mg\par norvasc 5 mg qd\par \par \par \par \par \par \par \par \par \par \par INTERPRETATION:\par CTA (CT ANGIOGRAPHY) of the CHEST, ABDOMEN and PELVIS dated 4/15/2013 10:26\par PM\par \par INDICATION: Back pain, vertigo.\par \par TECHNIQUE: CTA (CT ANGIOGRAPHY) of the chest, abdomen and pelvis was\par performed. Multiplanar images of the chest, abdomen and pelvis were\par reconstructed on an independent work-station. Image postprocessing\par including production of axial images and coronal and sagittal maximum\par intensity projections (MIPs).\par \par CONTRAST USAGE:\par IV contrast: Optiray 350: 125 ml administered; 0 ml discarded.\par Oral contrast: Not administered.\par \par PRIOR STUDIES: None.\par \par FINDINGS: No aortic dissection is seen. 4-cm borderline aneurysmal\par ectasia of the ascending thoracic aorta is noted.. No critical stenoses are\par seen.\par \par Evaluation of the pulmonary arteries is limited due to phase of contrast\par appeared within that limitation no central pulmonary embolism is seen. The\par segmental and subsegmental pulmonary arteries cannot be evaluated for the\par presence of absence of pulmonary embolism on this examination. Dilated\par main pulmonary artery suggesting pulmonary arterial hypertension.\par \par The heart is normal in size. Coronary artery calcifications. No\par pericardial effusion is seen. 1 cm in short axis right paratracheal lymph\par node. Up to 1 cm in short axis left axillary nodes.\par \par Evaluation of pulmonary parenchyma demonstrates mild dependent changes in\par both lungs. Faint groundglass opacities bilaterally may represent alveolar\par edema, nonspecific alveolitis or infiltrates. No pleural effusions are\par seen.\par \par Images of the upper abdomen demonstrate diffuse low-density liver\par consistent with fatty infiltration of the liver. Hepatomegaly showing 21.2\par cm in cephalocaudal length. No radiopaque stones are seen in the\par gallbladder. The pancreas is normal in appearance. No splenic\par abnormalities are seen.\par \par The adrenal glands are unremarkable. The kidneys are normal in appearance.\par No lymphadenopathy is seen.\par \par Evaluation of the bowel is limited without oral contrast. Within that\par limitation, these images demonstrate no bowel obstruction or free\par intraperitoneal air. Fat containing umbilical hernia is noted. Radiopaque\par rounded densities within the bowel consistent with ingested food,\par medications or other substances. Normal appendix. No ascites is seen.\par \par Images of the pelvis demonstrate the nodular uterine fundus anteriorly\par compatible with a fibroid uterus. No adnexal masses. Unremarkable\par appearance of the urinary bladder. Small bilateral inguinal and external\par iliac lymph nodes are seen.\par \par Evaluation of the osseous structures demonstrates scattered degenerative\par changes, most prominent in the right hip.\par \par IMPRESSION:\par No evidence of aortic dissection. 4-cm borderline aneurysmal ectasia of\par the ascending thoracic aorta Dilated main pulmonary artery suggesting\par pulmonary artery hypertension.\par \par Faint groundglass opacities bilaterally may represent alveolar edema,\par nonspecific alveolitis or infiltrates.\par \par Reviewed by [id number] Conner Colon MD\par \par Reviewed by [id number] Conner Colon MD and Signed by [id number] Christi BRAUN.\par MD Drea; 2013 12:08 AM\par DICTATED: 2013\par CHRISTI VIEYRA MD 2013 \par

## 2019-04-22 NOTE — DISCUSSION/SUMMARY
[Patient] : the patient [___ Week(s)] : [unfilled] week(s) [FreeTextEntry1] : \par 63 year old female with h/o morbid obesity, htn, hl, cad, + family h/o cad, prediabetes, depression presents for f/up today\par \par -endocrine f/up\par -continue asa\par -CTA cor's 5/17 mild nonobstructive 3vd\par -continue lipitor 80 qhs\par -weight loss, diet, exercise (not interested in bariatric surgery)\par -Echo 3/17 showed normal ef 55%, no wma, no significant valvular disease\par -EP recs, monitor results showed pac's, short run AT\par -neuro w/up for presyncope unrevealing with normal eeg, mri, carotid\par -blood sugar monitoring\par -ordered ekg today - nsr, normal intervals, no st/t changes\par -labs 9/18 reviewed\par -continue norvasc - increase to norvasc 10 qd\par -continue BB\par -f/up 3 weeks for bp\par

## 2019-05-13 ENCOUNTER — APPOINTMENT (OUTPATIENT)
Dept: HEART AND VASCULAR | Facility: CLINIC | Age: 64
End: 2019-05-13

## 2019-06-06 ENCOUNTER — NON-APPOINTMENT (OUTPATIENT)
Age: 64
End: 2019-06-06

## 2019-06-06 ENCOUNTER — APPOINTMENT (OUTPATIENT)
Dept: HEART AND VASCULAR | Facility: CLINIC | Age: 64
End: 2019-06-06
Payer: COMMERCIAL

## 2019-06-06 VITALS
WEIGHT: 293 LBS | BODY MASS INDEX: 51.91 KG/M2 | OXYGEN SATURATION: 98 % | HEART RATE: 90 BPM | DIASTOLIC BLOOD PRESSURE: 86 MMHG | SYSTOLIC BLOOD PRESSURE: 132 MMHG | HEIGHT: 63 IN

## 2019-06-06 PROCEDURE — 99213 OFFICE O/P EST LOW 20 MIN: CPT | Mod: 25

## 2019-06-06 PROCEDURE — 93000 ELECTROCARDIOGRAM COMPLETE: CPT

## 2019-06-06 NOTE — HISTORY OF PRESENT ILLNESS
[FreeTextEntry1] : 63 year old female with h/o obesity, prediabetes, htn, hl, cad, depression who presents for f/up today.\par Last seen  and norvasc increased\par \par Has had extensive w/up for presyncope. \par Had another episode recently where she fell\par Has never seen ent\par Seen by neuro and had \par REEG/AMB EEG- normal. Carotid duplex showed no hemodynamically significant stenosisMRI brain was unremarkable.\par \par Seen by EP who recommended leslee eval which she declined. Also placed holter/event monitor\par Holter/event:  avg HR 59, fluttering corresponded to pac's and short atrial runs\par \par CTA cor's : borderline ectasia of aortic root/asc aorta 3.9 cm, calcium score 614, 98%, LM normal, prox/mid LAD mild stenosis, distal LAD minimal stenosis, D1 normal, D2 minimal stenosis, LCx prox/mid mild stenosis, OM1 small patent, OM2 normal, RCA prox mild stenosis, mid/distal RCA minimal stenosis, RPDA/RPL mild stenosis, ef 50%\par \par \par Seen by Endo for thyroid nodule and hypoglycemia eval. \par \par Echo 3/17 with normal ef 55%, mild conc lvh, no significant valvular disease.\par \par \par She denies cp, edema, orthopnea, pnd, sob, palpitations. 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)\par prediabetic\par vertigo\par htn\par hl\par bronchitis\par \par \par ALL:\par codeine derivatives\par horse sera agents\par sulfadiazine\par \par \par SH:\par quit tobacco >10 years, had smoked while in 20's\par no etoh\par no drugs\par used to work as  (currently laid off)\par lives alone\par never  \par no children\par \par FH:\par mother -  72 - Rheumatic fever, MI's in 40's\par father -  80's - cabg 60's\par \par MEDS:\par asa 81 mg qd\par lipitor 80 mg qhs\par toprol xl 25 mg qd\par chlorzoxazone 500 mg\par norvasc 10 mg qd\par \par \par \par \par \par \par \par \par \par \par INTERPRETATION:\par CTA (CT ANGIOGRAPHY) of the CHEST, ABDOMEN and PELVIS dated 4/15/2013 10:26\par PM\par \par INDICATION: Back pain, vertigo.\par \par TECHNIQUE: CTA (CT ANGIOGRAPHY) of the chest, abdomen and pelvis was\par performed. Multiplanar images of the chest, abdomen and pelvis were\par reconstructed on an independent work-station. Image postprocessing\par including production of axial images and coronal and sagittal maximum\par intensity projections (MIPs).\par \par CONTRAST USAGE:\par IV contrast: Optiray 350: 125 ml administered; 0 ml discarded.\par Oral contrast: Not administered.\par \par PRIOR STUDIES: None.\par \par FINDINGS: No aortic dissection is seen. 4-cm borderline aneurysmal\par ectasia of the ascending thoracic aorta is noted.. No critical stenoses are\par seen.\par \par Evaluation of the pulmonary arteries is limited due to phase of contrast\par appeared within that limitation no central pulmonary embolism is seen. The\par segmental and subsegmental pulmonary arteries cannot be evaluated for the\par presence of absence of pulmonary embolism on this examination. Dilated\par main pulmonary artery suggesting pulmonary arterial hypertension.\par \par The heart is normal in size. Coronary artery calcifications. No\par pericardial effusion is seen. 1 cm in short axis right paratracheal lymph\par node. Up to 1 cm in short axis left axillary nodes.\par \par Evaluation of pulmonary parenchyma demonstrates mild dependent changes in\par both lungs. Faint groundglass opacities bilaterally may represent alveolar\par edema, nonspecific alveolitis or infiltrates. No pleural effusions are\par seen.\par \par Images of the upper abdomen demonstrate diffuse low-density liver\par consistent with fatty infiltration of the liver. Hepatomegaly showing 21.2\par cm in cephalocaudal length. No radiopaque stones are seen in the\par gallbladder. The pancreas is normal in appearance. No splenic\par abnormalities are seen.\par \par The adrenal glands are unremarkable. The kidneys are normal in appearance.\par No lymphadenopathy is seen.\par \par Evaluation of the bowel is limited without oral contrast. Within that\par limitation, these images demonstrate no bowel obstruction or free\par intraperitoneal air. Fat containing umbilical hernia is noted. Radiopaque\par rounded densities within the bowel consistent with ingested food,\par medications or other substances. Normal appendix. No ascites is seen.\par \par Images of the pelvis demonstrate the nodular uterine fundus anteriorly\par compatible with a fibroid uterus. No adnexal masses. Unremarkable\par appearance of the urinary bladder. Small bilateral inguinal and external\par iliac lymph nodes are seen.\par \par Evaluation of the osseous structures demonstrates scattered degenerative\par changes, most prominent in the right hip.\par \par IMPRESSION:\par No evidence of aortic dissection. 4-cm borderline aneurysmal ectasia of\par the ascending thoracic aorta Dilated main pulmonary artery suggesting\par pulmonary artery hypertension.\par \par Faint groundglass opacities bilaterally may represent alveolar edema,\par nonspecific alveolitis or infiltrates.\par \par Reviewed by [id number] Conner Colon MD\par \par Reviewed by [id number] Conner Colon MD and Signed by [id number] Christi BRAUN.\par MD Drea; 2013 12:08 AM\par DICTATED: 2013\par CHRISTI VIEYRA MD 2013 \par

## 2019-06-06 NOTE — DISCUSSION/SUMMARY
[Patient] : the patient [___ Month(s)] : [unfilled] month(s) [FreeTextEntry1] : 63 year old female with h/o morbid obesity, htn, hl, cad, + family h/o cad, prediabetes, depression presents for f/up today\par \par -endocrine f/up\par -continue asa\par -CTA cor's 5/17 mild nonobstructive 3vd\par -continue lipitor 80 qhs\par -weight loss, diet, exercise (not interested in bariatric surgery)\par -Echo 3/17 showed normal ef 55%, no wma, no significant valvular disease\par -EP recs, monitor results showed pac's, short run AT\par -neuro w/up for presyncope unrevealing with normal eeg, mri, carotid\par -blood sugar monitoring\par -ekg ordered today- nsr, normal intervals, no st/t changes\par -labs 9/18 reviewed\par -continue norvasc \par -continue BB\par -close f/up of bp and consider increase bb or add ACE\par -f/up 4 months\par

## 2019-08-21 ENCOUNTER — APPOINTMENT (OUTPATIENT)
Dept: ENDOCRINOLOGY | Facility: CLINIC | Age: 64
End: 2019-08-21

## 2019-10-14 ENCOUNTER — RX RENEWAL (OUTPATIENT)
Age: 64
End: 2019-10-14

## 2019-11-14 ENCOUNTER — APPOINTMENT (OUTPATIENT)
Dept: ENDOCRINOLOGY | Facility: CLINIC | Age: 64
End: 2019-11-14

## 2019-12-19 ENCOUNTER — RX RENEWAL (OUTPATIENT)
Age: 64
End: 2019-12-19

## 2020-08-03 ENCOUNTER — APPOINTMENT (OUTPATIENT)
Dept: HEART AND VASCULAR | Facility: CLINIC | Age: 65
End: 2020-08-03

## 2020-08-13 ENCOUNTER — RX RENEWAL (OUTPATIENT)
Age: 65
End: 2020-08-13

## 2020-10-15 ENCOUNTER — RX RENEWAL (OUTPATIENT)
Age: 65
End: 2020-10-15

## 2020-10-22 ENCOUNTER — RX RENEWAL (OUTPATIENT)
Age: 65
End: 2020-10-22

## 2021-02-12 ENCOUNTER — RX RENEWAL (OUTPATIENT)
Age: 66
End: 2021-02-12

## 2021-05-21 ENCOUNTER — RX RENEWAL (OUTPATIENT)
Age: 66
End: 2021-05-21

## 2021-06-21 ENCOUNTER — RX RENEWAL (OUTPATIENT)
Age: 66
End: 2021-06-21

## 2021-08-16 ENCOUNTER — RX RENEWAL (OUTPATIENT)
Age: 66
End: 2021-08-16

## 2021-09-20 ENCOUNTER — APPOINTMENT (OUTPATIENT)
Dept: HEART AND VASCULAR | Facility: CLINIC | Age: 66
End: 2021-09-20
Payer: MEDICARE

## 2021-09-20 ENCOUNTER — LABORATORY RESULT (OUTPATIENT)
Age: 66
End: 2021-09-20

## 2021-09-20 ENCOUNTER — NON-APPOINTMENT (OUTPATIENT)
Age: 66
End: 2021-09-20

## 2021-09-20 VITALS
HEART RATE: 103 BPM | DIASTOLIC BLOOD PRESSURE: 97 MMHG | HEIGHT: 63 IN | SYSTOLIC BLOOD PRESSURE: 132 MMHG | TEMPERATURE: 98.5 F | WEIGHT: 293 LBS | BODY MASS INDEX: 51.91 KG/M2 | OXYGEN SATURATION: 93 %

## 2021-09-20 PROCEDURE — 36415 COLL VENOUS BLD VENIPUNCTURE: CPT

## 2021-09-20 PROCEDURE — 99214 OFFICE O/P EST MOD 30 MIN: CPT | Mod: 25

## 2021-09-20 PROCEDURE — 93000 ELECTROCARDIOGRAM COMPLETE: CPT

## 2021-09-20 NOTE — HISTORY OF PRESENT ILLNESS
[FreeTextEntry1] : 66 year old female with h/o obesity, prediabetes, htn, hl, cad, depression who presents for f/up today.\par \par Last seen \par \par has not seen MD in 2 years\par \par She denies cp, edema, orthopnea, pnd, sob, palpitations. She denies syncope \par \par Has had extensive w/up for presyncope. \par Seen by neuro and had \par REEG/AMB EEG- normal. Carotid duplex showed no hemodynamically significant stenosis. MRI brain was unremarkable.\par \par Seen by EP who recommended leslee eval which she declined. Also placed holter/event monitor\par Holter/event:  avg HR 59, fluttering corresponded to pac's and short atrial runs\par \par CTA cor's : borderline ectasia of aortic root/asc aorta 3.9 cm, calcium score 614, 98%, LM normal, prox/mid LAD mild stenosis, distal LAD minimal stenosis, D1 normal, D2 minimal stenosis, LCx prox/mid mild stenosis, OM1 small patent, OM2 normal, RCA prox mild stenosis, mid/distal RCA minimal stenosis, RPDA/RPL mild stenosis, ef 50%\par \par \par Seen by Endo for thyroid nodule and hypoglycemia eval. 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)\par prediabetic\par vertigo\par htn\par hl\par bronchitis\par \par \par ALL:\par codeine derivatives\par horse sera agents\par sulfadiazine\par \par \par SH:\par quit tobacco >10 years, had smoked while in 20's\par no etoh\par no drugs\par used to work as  (currently laid off)\par lives alone\par never  \par no children\par \par FH:\par mother -  72 - Rheumatic fever, MI's in 40's\par father -  80's - cabg 60's\par \par MEDS:\par asa 81 mg qd\par lipitor 80 mg qhs\par toprol xl 25 mg qd\par norvasc 10 mg qd\par \par \par \par \par \par \par \par \par \par \par INTERPRETATION:\par CTA (CT ANGIOGRAPHY) of the CHEST, ABDOMEN and PELVIS dated 4/15/2013 10:26\par PM\par \par INDICATION: Back pain, vertigo.\par \par TECHNIQUE: CTA (CT ANGIOGRAPHY) of the chest, abdomen and pelvis was\par performed. Multiplanar images of the chest, abdomen and pelvis were\par reconstructed on an independent work-station. Image postprocessing\par including production of axial images and coronal and sagittal maximum\par intensity projections (MIPs).\par \par CONTRAST USAGE:\par IV contrast: Optiray 350: 125 ml administered; 0 ml discarded.\par Oral contrast: Not administered.\par \par PRIOR STUDIES: None.\par \par FINDINGS: No aortic dissection is seen. 4-cm borderline aneurysmal\par ectasia of the ascending thoracic aorta is noted.. No critical stenoses are\par seen.\par \par Evaluation of the pulmonary arteries is limited due to phase of contrast\par appeared within that limitation no central pulmonary embolism is seen. The\par segmental and subsegmental pulmonary arteries cannot be evaluated for the\par presence of absence of pulmonary embolism on this examination. Dilated\par main pulmonary artery suggesting pulmonary arterial hypertension.\par \par The heart is normal in size. Coronary artery calcifications. No\par pericardial effusion is seen. 1 cm in short axis right paratracheal lymph\par node. Up to 1 cm in short axis left axillary nodes.\par \par Evaluation of pulmonary parenchyma demonstrates mild dependent changes in\par both lungs. Faint groundglass opacities bilaterally may represent alveolar\par edema, nonspecific alveolitis or infiltrates. No pleural effusions are\par seen.\par \par Images of the upper abdomen demonstrate diffuse low-density liver\par consistent with fatty infiltration of the liver. Hepatomegaly showing 21.2\par cm in cephalocaudal length. No radiopaque stones are seen in the\par gallbladder. The pancreas is normal in appearance. No splenic\par abnormalities are seen.\par \par The adrenal glands are unremarkable. The kidneys are normal in appearance.\par No lymphadenopathy is seen.\par \par Evaluation of the bowel is limited without oral contrast. Within that\par limitation, these images demonstrate no bowel obstruction or free\par intraperitoneal air. Fat containing umbilical hernia is noted. Radiopaque\par rounded densities within the bowel consistent with ingested food,\par medications or other substances. Normal appendix. No ascites is seen.\par \par Images of the pelvis demonstrate the nodular uterine fundus anteriorly\par compatible with a fibroid uterus. No adnexal masses. Unremarkable\par appearance of the urinary bladder. Small bilateral inguinal and external\par iliac lymph nodes are seen.\par \par Evaluation of the osseous structures demonstrates scattered degenerative\par changes, most prominent in the right hip.\par \par IMPRESSION:\par No evidence of aortic dissection. 4-cm borderline aneurysmal ectasia of\par the ascending thoracic aorta Dilated main pulmonary artery suggesting\par pulmonary artery hypertension.\par \par Faint groundglass opacities bilaterally may represent alveolar edema,\par nonspecific alveolitis or infiltrates.\par \par Reviewed by [id number] Conner Colon MD\par \par Reviewed by [id number] Conner Colon MD and Signed by [id number] Christi BRAUN.\par MD Drea; 2013 12:08 AM\par DICTATED: 2013\par CHRISTI VIEYRA MD 2013 \par

## 2021-09-20 NOTE — DISCUSSION/SUMMARY
[Patient] : the patient [___ Week(s)] : in [unfilled] week(s) [FreeTextEntry1] : 66 year old female with h/o morbid obesity, htn, hl, cad, + family h/o cad, prediabetes, depression presents for f/up today\par \par -endocrine f/up\par -continue asa\par -start losartan 25 mg qd\par -increase toprol to 50\par -CTA cor's 5/17 mild nonobstructive 3vd\par -continue lipitor 80 qhs\par -weight loss, diet, exercise (not interested in bariatric surgery)\par -Echo 3/17 showed normal ef 55%, no wma, no significant valvular disease\par -EP recs, monitor results showed pac's, short run AT\par -neuro w/up for presyncope unrevealing with normal eeg, mri, carotid\par -blood sugar monitoring\par -ekg ordered today- ST, normal intervals, no st/t changes\par -labs 9/18 reviewed, ordered labs today\par -continue norvasc \par -f/up 3 weeks for htn for bmp/bp\par \par I have spent 30 minutes reviewing labs, records, tests and discussing bp control, cvd risk factor management\par

## 2021-09-21 ENCOUNTER — TRANSCRIPTION ENCOUNTER (OUTPATIENT)
Age: 66
End: 2021-09-21

## 2021-09-21 LAB
ALBUMIN SERPL ELPH-MCNC: 4.4 G/DL
ALP BLD-CCNC: 107 U/L
ALT SERPL-CCNC: 26 U/L
ANION GAP SERPL CALC-SCNC: 16 MMOL/L
AST SERPL-CCNC: 19 U/L
BASOPHILS # BLD AUTO: 0.06 K/UL
BASOPHILS NFR BLD AUTO: 0.9 %
BILIRUB SERPL-MCNC: 0.6 MG/DL
BUN SERPL-MCNC: 18 MG/DL
CALCIUM SERPL-MCNC: 9.3 MG/DL
CHLORIDE SERPL-SCNC: 104 MMOL/L
CHOLEST SERPL-MCNC: 195 MG/DL
CO2 SERPL-SCNC: 22 MMOL/L
CREAT SERPL-MCNC: 0.87 MG/DL
EOSINOPHIL # BLD AUTO: 0.23 K/UL
EOSINOPHIL NFR BLD AUTO: 3.3 %
ESTIMATED AVERAGE GLUCOSE: 114 MG/DL
GLUCOSE SERPL-MCNC: 103 MG/DL
HBA1C MFR BLD HPLC: 5.6 %
HCT VFR BLD CALC: 48 %
HDLC SERPL-MCNC: 61 MG/DL
HGB BLD-MCNC: 15.3 G/DL
IMM GRANULOCYTES NFR BLD AUTO: 0.4 %
LDLC SERPL CALC-MCNC: 116 MG/DL
LYMPHOCYTES # BLD AUTO: 1.39 K/UL
LYMPHOCYTES NFR BLD AUTO: 19.9 %
MAGNESIUM SERPL-MCNC: 2.2 MG/DL
MAN DIFF?: NORMAL
MCHC RBC-ENTMCNC: 30.2 PG
MCHC RBC-ENTMCNC: 31.9 GM/DL
MCV RBC AUTO: 94.7 FL
MONOCYTES # BLD AUTO: 0.48 K/UL
MONOCYTES NFR BLD AUTO: 6.9 %
NEUTROPHILS # BLD AUTO: 4.79 K/UL
NEUTROPHILS NFR BLD AUTO: 68.6 %
NONHDLC SERPL-MCNC: 134 MG/DL
PLATELET # BLD AUTO: 295 K/UL
POTASSIUM SERPL-SCNC: 4.2 MMOL/L
PROT SERPL-MCNC: 7.1 G/DL
RBC # BLD: 5.07 M/UL
RBC # FLD: 14.6 %
SODIUM SERPL-SCNC: 142 MMOL/L
TRIGL SERPL-MCNC: 90 MG/DL
TSH SERPL-ACNC: 4.31 UIU/ML
WBC # FLD AUTO: 6.98 K/UL

## 2021-09-22 ENCOUNTER — TRANSCRIPTION ENCOUNTER (OUTPATIENT)
Age: 66
End: 2021-09-22

## 2021-10-14 ENCOUNTER — TRANSCRIPTION ENCOUNTER (OUTPATIENT)
Age: 66
End: 2021-10-14

## 2021-10-14 ENCOUNTER — APPOINTMENT (OUTPATIENT)
Dept: INTERNAL MEDICINE | Facility: CLINIC | Age: 66
End: 2021-10-14

## 2021-11-01 ENCOUNTER — TRANSCRIPTION ENCOUNTER (OUTPATIENT)
Age: 66
End: 2021-11-01

## 2021-11-11 ENCOUNTER — APPOINTMENT (OUTPATIENT)
Dept: INTERNAL MEDICINE | Facility: CLINIC | Age: 66
End: 2021-11-11

## 2021-11-12 ENCOUNTER — APPOINTMENT (OUTPATIENT)
Dept: INTERNAL MEDICINE | Facility: CLINIC | Age: 66
End: 2021-11-12

## 2021-11-12 ENCOUNTER — TRANSCRIPTION ENCOUNTER (OUTPATIENT)
Age: 66
End: 2021-11-12

## 2021-11-12 ENCOUNTER — APPOINTMENT (OUTPATIENT)
Dept: INTERNAL MEDICINE | Facility: CLINIC | Age: 66
End: 2021-11-12
Payer: MEDICARE

## 2021-11-12 VITALS
OXYGEN SATURATION: 95 % | SYSTOLIC BLOOD PRESSURE: 126 MMHG | HEART RATE: 85 BPM | TEMPERATURE: 98.3 F | DIASTOLIC BLOOD PRESSURE: 91 MMHG | HEIGHT: 63 IN

## 2021-11-12 DIAGNOSIS — M54.9 DORSALGIA, UNSPECIFIED: ICD-10-CM

## 2021-11-12 DIAGNOSIS — F32.A DEPRESSION, UNSPECIFIED: ICD-10-CM

## 2021-11-12 DIAGNOSIS — Z74.09 OTHER REDUCED MOBILITY: ICD-10-CM

## 2021-11-12 PROCEDURE — 99214 OFFICE O/P EST MOD 30 MIN: CPT | Mod: GC

## 2021-11-12 NOTE — END OF VISIT
[] : Resident [FreeTextEntry3] :  66 year old F presenting for follow up of mobility issues. Has known severe OA of multiple joints, has had progressive difficulty ambulating due to joint pain. Referred to PT, elder services. Pt declines health screening tests today, particularly iFOBT. Will need to address further at follow up. RTC 6 weeks or earlier prn.

## 2021-11-12 NOTE — HISTORY OF PRESENT ILLNESS
[FreeTextEntry8] : 63F PMH morbid obesity, HTN, HLD, CAD, preDM and thyroid nodules presents to the clinic for mobility issues. Patient reports that she has been having progressively worsening knee, hip and back pain. Patient reports that this has been associated with 40 lb weight loss due to worsening mobility. When asked, the patient reports that she previously was with an evaluation by orthopedics where she was told she had a 'hole in her knee'. Patient reports she is not ambulating well and requires a shopping cart while outside and has fear of falling while at home. Denies previous falls Reports she feels unwell while in shower or conducting ADLs with severe fear of falls. \par Patient is requesting help at home for social needs. Denies acute medical complaints at time of exam. \par Patient was seen by SW prior to exam today and was referred to APS and senior services.

## 2021-11-12 NOTE — REVIEW OF SYSTEMS
[Recent Change In Weight] : ~T recent weight change [Lower Ext Edema] : lower extremity edema [Shortness Of Breath] : shortness of breath [Joint Pain] : joint pain [Joint Stiffness] : joint stiffness [Muscle Pain] : muscle pain [Back Pain] : back pain [Fever] : no fever [Chills] : no chills [Fatigue] : no fatigue [Hot Flashes] : no hot flashes [Night Sweats] : no night sweats [Discharge] : no discharge [Vision Problems] : no vision problems [Chest Pain] : no chest pain [Palpitations] : no palpitations [Leg Claudication] : no leg claudication [Orthopnea] : no orthopnea [Paroxysmal Nocturnal Dyspnea] : no paroxysmal nocturnal dyspnea [Wheezing] : no wheezing [Cough] : no cough [Abdominal Pain] : no abdominal pain [Nausea] : no nausea [Constipation] : no constipation [Diarrhea] : diarrhea [Vomiting] : no vomiting [Heartburn] : no heartburn [Melena] : no melena [Joint Swelling] : no joint swelling [Muscle Weakness] : no muscle weakness [Suicidal] : not suicidal [Insomnia] : no insomnia [Anxiety] : no anxiety [Depression] : no depression [Easy Bleeding] : no easy bleeding [Easy Bruising] : no easy bruising [Swollen Glands] : no swollen glands [FreeTextEntry2] : Weight gain

## 2021-11-12 NOTE — ASSESSMENT
[FreeTextEntry1] : 63F PMH morbid obesity, HTN, HLD, CAD, preDM presents with decreased mobility and social concerns as well as depression\par \par #Mobility\par Patient reporting chronic insidious worsening of knee, back and right hip pain. Pain has worsened over past several months and is associated with 40 lbs of weight gain. Pt refusing to see orthopedic surgery at this time as she reports she does not want surgery. Patient is reporting increased concern about falling at home, though denies falls. Reports she lives in an elevator building and uses a shopping cart whenever she leaves her home. Refusing a cane or walker. Reports she wants to work with physical therapy and is motivated to try to lose weight. Pt refusing MRI evaluation of joints\par - PT order placed\par - APS referral placed\par - Senior services referral placed for safety at home, patient may need durable medical equipment\par - Patient likely would benefit from orthopedic or bariatric surgery consults, however is refusing these interventions at this time\par \par #Depression\par Patient with depressed affect and mood. Not a danger to self or others. Was seen by SW prior to appointment today. Refusing medications at this time. Reports she has no family or significant friends, poor support network.\par - Referral placed to psychology for possible CBT, though patient may refuse\par \par #HTN\par Normotensive on exam today\par - c/w amlodipine 10 mg and valsartan 160 mg\par \par #COPD\par Lungs CTA bilaterally \par - c/w current medications\par \par #Obesity\par Patient with morbid obesity, refusing bariatric intervention despite multiple failed attempts to lose weight. \par - Encouraged continued dietary modifications and exercise as tolerated\par \par #HCM\par Patient refusing FIT kit due to copay\par Patient needs multiple HCM screenings and vaccines, plan for patient to RTC in 4-6 weeks for evaluation

## 2021-11-12 NOTE — PHYSICAL EXAM
[No Acute Distress] : no acute distress [PERRL] : pupils equal round and reactive to light [EOMI] : extraocular movements intact [No JVD] : no jugular venous distention [No Respiratory Distress] : no respiratory distress  [No Accessory Muscle Use] : no accessory muscle use [Clear to Auscultation] : lungs were clear to auscultation bilaterally [Normal Rate] : normal rate  [Regular Rhythm] : with a regular rhythm [Normal S1, S2] : normal S1 and S2 [Soft] : abdomen soft [Non Tender] : non-tender [Coordination Grossly Intact] : coordination grossly intact [No Focal Deficits] : no focal deficits [de-identified] : ill appearing, morbid obesity [de-identified] : foot drop on right side

## 2021-11-15 ENCOUNTER — APPOINTMENT (OUTPATIENT)
Dept: HEART AND VASCULAR | Facility: CLINIC | Age: 66
End: 2021-11-15
Payer: MEDICARE

## 2021-11-15 ENCOUNTER — NON-APPOINTMENT (OUTPATIENT)
Age: 66
End: 2021-11-15

## 2021-11-15 VITALS
HEART RATE: 79 BPM | DIASTOLIC BLOOD PRESSURE: 78 MMHG | BODY MASS INDEX: 51.91 KG/M2 | WEIGHT: 293 LBS | TEMPERATURE: 97.5 F | SYSTOLIC BLOOD PRESSURE: 120 MMHG | HEIGHT: 63 IN

## 2021-11-15 PROCEDURE — 99214 OFFICE O/P EST MOD 30 MIN: CPT | Mod: 25

## 2021-11-15 PROCEDURE — 36415 COLL VENOUS BLD VENIPUNCTURE: CPT

## 2021-11-15 NOTE — DISCUSSION/SUMMARY
[Patient] : the patient [___ Month(s)] : in [unfilled] month(s) [FreeTextEntry1] : 66 year old female with h/o morbid obesity, htn, hl, cad, + family h/o cad, prediabetes, depression presents for f/up today for sob\par \par -endocrine f/up\par -continue asa\par -continue losartan, toprol\par -CTA cor's 5/17 mild nonobstructive 3vd\par -continue lipitor 80 qhs, consider add zetia or pcsk9 given ldl 116\par -ordered bmp today\par -ordered nuclear stress (calcium score 2017 over 600) and echo for sob\par -weight loss, diet, exercise (not interested in bariatric surgery)\par -Echo 3/17 showed normal ef 55%, no wma, no significant valvular disease\par -EP recs, monitor results showed pac's, short run AT\par -neuro w/up for presyncope unrevealing with normal eeg, mri, carotid\par -blood sugar monitoring\par -ekg 9/21- ST, normal intervals, no st/t changes\par -labs 2021 reviewed\par -continue norvasc \par -f/up 1 month for cad\par \par I have spent 30 minutes reviewing labs, records, tests and discussing bp control, cvd risk factor, cad/lipid management\par

## 2021-11-15 NOTE — HISTORY OF PRESENT ILLNESS
[FreeTextEntry1] : 66 year old female with h/o obesity, prediabetes, htn, hl, cad, depression who presents for f/up today.\par \par Last seen \par last visit started on losartan 25 and toprol increased for 50\par now notes some RICHARDS\par \par \par She denies cp, edema, orthopnea, pnd, palpitations. She denies syncope \par \par Has had extensive w/up for presyncope. \par Seen by neuro and had \par REEG/AMB EEG- normal. Carotid duplex showed no hemodynamically significant stenosis. MRI brain was unremarkable.\par \par Seen by EP who recommended leslee eval which she declined. Also placed holter/event monitor\par Holter/event:  avg HR 59, fluttering corresponded to pac's and short atrial runs\par \par CTA cor's : borderline ectasia of aortic root/asc aorta 3.9 cm, calcium score 614, 98%, LM normal, prox/mid LAD mild stenosis, distal LAD minimal stenosis, D1 normal, D2 minimal stenosis, LCx prox/mid mild stenosis, OM1 small patent, OM2 normal, RCA prox mild stenosis, mid/distal RCA minimal stenosis, RPDA/RPL mild stenosis, ef 50%\par \par \par Seen by Endo for thyroid nodule and hypoglycemia eval. 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)\par prediabetic\par vertigo\par htn\par hl\par bronchitis\par \par \par ALL:\par codeine derivatives\par horse sera agents\par sulfadiazine\par \par \par SH:\par quit tobacco >10 years, had smoked while in 20's\par no etoh\par no drugs\par used to work as  (currently laid off)\par lives alone\par never  \par no children\par \par FH:\par mother -  72 - Rheumatic fever, MI's in 40's\par father -  80's - cabg 60's\par \par MEDS:\par asa 81 mg qd\par lipitor 80 mg qhs\par toprol xl 50 mg qd\par norvasc 10 mg qd\par losartan 25 mg qd\par \par \par \par \par \par \par \par \par \par \par INTERPRETATION:\par CTA (CT ANGIOGRAPHY) of the CHEST, ABDOMEN and PELVIS dated 4/15/2013 10:26\par PM\par \par INDICATION: Back pain, vertigo.\par \par TECHNIQUE: CTA (CT ANGIOGRAPHY) of the chest, abdomen and pelvis was\par performed. Multiplanar images of the chest, abdomen and pelvis were\par reconstructed on an independent work-station. Image postprocessing\par including production of axial images and coronal and sagittal maximum\par intensity projections (MIPs).\par \par CONTRAST USAGE:\par IV contrast: Optiray 350: 125 ml administered; 0 ml discarded.\par Oral contrast: Not administered.\par \par PRIOR STUDIES: None.\par \par FINDINGS: No aortic dissection is seen. 4-cm borderline aneurysmal\par ectasia of the ascending thoracic aorta is noted.. No critical stenoses are\par seen.\par \par Evaluation of the pulmonary arteries is limited due to phase of contrast\par appeared within that limitation no central pulmonary embolism is seen. The\par segmental and subsegmental pulmonary arteries cannot be evaluated for the\par presence of absence of pulmonary embolism on this examination. Dilated\par main pulmonary artery suggesting pulmonary arterial hypertension.\par \par The heart is normal in size. Coronary artery calcifications. No\par pericardial effusion is seen. 1 cm in short axis right paratracheal lymph\par node. Up to 1 cm in short axis left axillary nodes.\par \par Evaluation of pulmonary parenchyma demonstrates mild dependent changes in\par both lungs. Faint groundglass opacities bilaterally may represent alveolar\par edema, nonspecific alveolitis or infiltrates. No pleural effusions are\par seen.\par \par Images of the upper abdomen demonstrate diffuse low-density liver\par consistent with fatty infiltration of the liver. Hepatomegaly showing 21.2\par cm in cephalocaudal length. No radiopaque stones are seen in the\par gallbladder. The pancreas is normal in appearance. No splenic\par abnormalities are seen.\par \par The adrenal glands are unremarkable. The kidneys are normal in appearance.\par No lymphadenopathy is seen.\par \par Evaluation of the bowel is limited without oral contrast. Within that\par limitation, these images demonstrate no bowel obstruction or free\par intraperitoneal air. Fat containing umbilical hernia is noted. Radiopaque\par rounded densities within the bowel consistent with ingested food,\par medications or other substances. Normal appendix. No ascites is seen.\par \par Images of the pelvis demonstrate the nodular uterine fundus anteriorly\par compatible with a fibroid uterus. No adnexal masses. Unremarkable\par appearance of the urinary bladder. Small bilateral inguinal and external\par iliac lymph nodes are seen.\par \par Evaluation of the osseous structures demonstrates scattered degenerative\par changes, most prominent in the right hip.\par \par IMPRESSION:\par No evidence of aortic dissection. 4-cm borderline aneurysmal ectasia of\par the ascending thoracic aorta Dilated main pulmonary artery suggesting\par pulmonary artery hypertension.\par \par Faint groundglass opacities bilaterally may represent alveolar edema,\par nonspecific alveolitis or infiltrates.\par \par Reviewed by [id number] Conner Colon MD\par \par Reviewed by [id number] Conner Colon MD and Signed by [id number] Christi BRAUN.\par MD Drea; 2013 12:08 AM\par DICTATED: 2013\par CHRISTI VIEYRA MD 2013 \par

## 2021-11-17 LAB
ANION GAP SERPL CALC-SCNC: 16 MMOL/L
BUN SERPL-MCNC: 24 MG/DL
CALCIUM SERPL-MCNC: 9.6 MG/DL
CHLORIDE SERPL-SCNC: 106 MMOL/L
CO2 SERPL-SCNC: 21 MMOL/L
CREAT SERPL-MCNC: 1.01 MG/DL
GLUCOSE SERPL-MCNC: 100 MG/DL
POTASSIUM SERPL-SCNC: 4.1 MMOL/L
SODIUM SERPL-SCNC: 143 MMOL/L

## 2021-11-19 ENCOUNTER — APPOINTMENT (OUTPATIENT)
Dept: INTERNAL MEDICINE | Facility: CLINIC | Age: 66
End: 2021-11-19

## 2021-11-19 ENCOUNTER — TRANSCRIPTION ENCOUNTER (OUTPATIENT)
Age: 66
End: 2021-11-19

## 2021-12-10 ENCOUNTER — APPOINTMENT (OUTPATIENT)
Dept: INTERNAL MEDICINE | Facility: CLINIC | Age: 66
End: 2021-12-10
Payer: MEDICARE

## 2021-12-10 DIAGNOSIS — M16.9 OSTEOARTHRITIS OF HIP, UNSPECIFIED: ICD-10-CM

## 2021-12-10 DIAGNOSIS — R26.89 OTHER ABNORMALITIES OF GAIT AND MOBILITY: ICD-10-CM

## 2021-12-10 PROCEDURE — 99442: CPT

## 2021-12-10 NOTE — PLAN
[FreeTextEntry1] : 63F PMH morbid obesity, HTN, HLD, CAD, preDM presents for follow up via telephonic visit for decreased mobility.\par \par # Mobility\par Patient reporting chronic insidious worsening of L knee, lower back, and right hip pain. Patient denies any lower extremity weakness, numbness, and bowel/bladder incontinence. \par - PT order placed again\par - APS referral placed prior\par - Senior services referral placed for safety at home, patient may need durable medical equipment\par - patient amenable to ortho referral, will do xrays at that visit to limit her leaving the house to often to prevent exacerbating her pain\par - f/u [place]\par \par #Depression\par Patient with depressed affect and mood. Not a danger to self or others. Was seen by SW in 11/2021. Refusing medications at that time and now. Reports she has no family or significant friends, poor support network.\par - Patient refusing psychologist or medication and wants to fix underlying cause which is immobility\par \par # HTN\par - c/w amlodipine 10 mg and losartan 25mg daily\par \par #COPD\par - not currently in exacerbation\par \par #Obesity\par Patient with morbid obesity, refusing bariatric intervention despite multiple failed attempts to lose weight. \par - Encouraged continued dietary modifications and exercise as tolerated\par \par # HCM\par - got covid with booster (Moderna)\par - refuses flu shot due to previous adverse side effect\par - Patient needs multiple HCM screenings and vaccines but at this time refuses until her mobility issues improve, plan for patient to RTC in 4-6 weeks for evaluation.

## 2021-12-10 NOTE — END OF VISIT
[FreeTextEntry3] : 67 yo female with hx obesity, HTN, HPL, preDM, CAD, decreased mobility due to joint pain, for TTM visit for f/u\par \par 1) decreased mobility - 2/2 hip and knee pain, likely OA and in setting weight gain.  will refer to ortho.  recommended PT.  \par 2) depression - chronic, worsened due to mobility difficulties.  declines medication at this time. [Time Spent: ___ minutes] : I have spent [unfilled] minutes of time on the encounter.

## 2021-12-10 NOTE — REVIEW OF SYSTEMS
[Joint Pain] : joint pain [Joint Stiffness] : joint stiffness [Muscle Pain] : muscle pain [Back Pain] : back pain [Negative] : Gastrointestinal [Joint Swelling] : no joint swelling [Muscle Weakness] : no muscle weakness [FreeTextEntry9] : pain in her lower back, right hip, and left knee

## 2021-12-10 NOTE — HISTORY OF PRESENT ILLNESS
[Home] : at home, [unfilled] , at the time of the visit. [Medical Office: ([facility])___] : at the medical office located in  [Verbal consent obtained from patient] : the patient, [unfilled] [de-identified] : DALE PANIAGUA,an established pt at this office, reached out to this provider for xxxx. No recent visit within the last 7 days. A visit is not scheduled within the next 7 days at this time. \par \par Discussed with patient: You have chosen to receive care through the use of tele-media. Tele-media enables health care providers at different locations to provide safe, effective, and convenient care through the use of technology. Please note this is a billable encounter. As with any health care service, there are risks associated with the use of tele-media, including equipment failure, poor image and/or resolution, and  issues. You understand that I cannot physically examine you and that you may need to come to the clinic to complete the assessment. Patient agreed verbally to understanding the risks and benefits of tele-media as explained. All questions regarding tele-media encounters were answered. \par \par Returned phone call to patient to review complaint of limited mobility\par \par \par Total time spent with patient on the phone is 30 mins.\par \par 66F PMH obesity, prediabetes, HTN, HLD, CAD, depression who presents for follow up visit via telephonic appt. She still complains of decreased mobility due to chronic, progressive pain in her lower back, right hip, and left knee. She reports that she has sciatica and notices pain radiating down her right leg. She endorsed a 40lb weight gain during her last visit last month but says her weight has been stable since. She is feeling depressed because she is not able to ambulate as she id previously and her pain is preventing her from leaving her home. She refused a cane and a walker last time and now because she states that "she wants to fix the underlying cause first." Since last visit she has been having trouble scheduling a physical therapy appt. She has refused ortho at that time but now is amenable. She wants to limit the amount of times she leaves her home due to pain. Patient denies any lower extremity weakness, numbness, and bowel/bladder incontinence. \par \par \par

## 2021-12-13 ENCOUNTER — NON-APPOINTMENT (OUTPATIENT)
Age: 66
End: 2021-12-13

## 2021-12-14 ENCOUNTER — APPOINTMENT (OUTPATIENT)
Dept: ORTHOPEDIC SURGERY | Facility: CLINIC | Age: 66
End: 2021-12-14

## 2021-12-16 ENCOUNTER — NON-APPOINTMENT (OUTPATIENT)
Age: 66
End: 2021-12-16

## 2021-12-21 ENCOUNTER — APPOINTMENT (OUTPATIENT)
Dept: ORTHOPEDIC SURGERY | Facility: CLINIC | Age: 66
End: 2021-12-21

## 2022-01-31 ENCOUNTER — RX RENEWAL (OUTPATIENT)
Age: 67
End: 2022-01-31

## 2022-02-16 ENCOUNTER — RESULT REVIEW (OUTPATIENT)
Age: 67
End: 2022-02-16

## 2022-02-16 ENCOUNTER — OUTPATIENT (OUTPATIENT)
Dept: OUTPATIENT SERVICES | Facility: HOSPITAL | Age: 67
LOS: 1 days | End: 2022-02-16
Payer: MEDICARE

## 2022-02-16 ENCOUNTER — APPOINTMENT (OUTPATIENT)
Dept: ORTHOPEDIC SURGERY | Facility: CLINIC | Age: 67
End: 2022-02-16
Payer: MEDICARE

## 2022-02-16 VITALS — HEIGHT: 63.5 IN | WEIGHT: 293 LBS | BODY MASS INDEX: 51.27 KG/M2

## 2022-02-16 VITALS — HEART RATE: 97 BPM | DIASTOLIC BLOOD PRESSURE: 73 MMHG | OXYGEN SATURATION: 97 % | SYSTOLIC BLOOD PRESSURE: 112 MMHG

## 2022-02-16 DIAGNOSIS — M17.0 BILATERAL PRIMARY OSTEOARTHRITIS OF KNEE: ICD-10-CM

## 2022-02-16 DIAGNOSIS — M16.11 UNILATERAL PRIMARY OSTEOARTHRITIS, RIGHT HIP: ICD-10-CM

## 2022-02-16 DIAGNOSIS — E66.01 MORBID (SEVERE) OBESITY DUE TO EXCESS CALORIES: ICD-10-CM

## 2022-02-16 PROCEDURE — 72020 X-RAY EXAM OF SPINE 1 VIEW: CPT | Mod: 26

## 2022-02-16 PROCEDURE — 73502 X-RAY EXAM HIP UNI 2-3 VIEWS: CPT | Mod: 26,RT

## 2022-02-16 PROCEDURE — 73502 X-RAY EXAM HIP UNI 2-3 VIEWS: CPT

## 2022-02-16 PROCEDURE — 73564 X-RAY EXAM KNEE 4 OR MORE: CPT

## 2022-02-16 PROCEDURE — 72020 X-RAY EXAM OF SPINE 1 VIEW: CPT

## 2022-02-16 PROCEDURE — 99204 OFFICE O/P NEW MOD 45 MIN: CPT

## 2022-02-16 PROCEDURE — 73564 X-RAY EXAM KNEE 4 OR MORE: CPT | Mod: 26,50

## 2022-02-19 PROBLEM — M17.0 PRIMARY OSTEOARTHRITIS OF KNEES, BILATERAL: Status: ACTIVE | Noted: 2022-02-19

## 2022-02-19 PROBLEM — E66.01 OBESITY, MORBID, BMI 50 OR HIGHER: Status: ACTIVE | Noted: 2017-08-17

## 2022-02-19 PROBLEM — M16.11 PRIMARY OSTEOARTHRITIS OF RIGHT HIP: Status: ACTIVE | Noted: 2017-07-18

## 2022-02-19 NOTE — HISTORY OF PRESENT ILLNESS
[de-identified] : 2/16/22: 67y/o female presenting for severe right hip, right lower extremity, and moderate bilateral knee pain. Symptoms progressive for the past 10+ years. Worst pain courses from the right SIJ down the posterolateral thigh to the upper lateral calf. Also with focal right groin pain and bilateral knee pain mostly at the medial joint lines. Treatments thus far have included HEP, ibuprofen, and Aleve. Ambulates without assistive device and reports that she can manage all her own ADLs.\par \par PMH sig for HTN, HLD. Morbidly obese, BMI 60 at self-reported 340lbs now. Weight has generally fluctuated between 300-400lbs. She reports that she is capable of managing her own weight through diet and exercise and is not interested in seeing a nutritionist or bariatric specialist.

## 2022-02-19 NOTE — PHYSICAL EXAM
[de-identified] : General appearance: well nourished and hydrated, pleasant, alert and oriented x 3, cooperative.  Obese habitus.\par HEENT: normocephalic, EOM intact, wearing mask, external auditory canal clear.  \par Cardiovascular: no lower leg edema, no varicosities, dorsalis pedis pulses palpable and symmetric.  \par Lymphatics: no palpable lymphadenopathy, no lymphedema.  \par Neurologic: sensation is normal, no muscle weakness in upper or lower extremities, patella tendon reflexes present and symmetric.  \par Dermatologic: skin moist, warm, no rash.  \par Spine: cervical spine with normal lordosis and painless range of motion, thoracic spine with normal kyphosis and painless range of motion, lumbosacral spine with normal lordosis and restricted painful range of motion.\par Gait: waddling bilaterally.\par \par Left knee:\par - Soft tissue swelling: difficult to assess secondary to habitus\par - Ecchymosis: none\par - Erythema: none\par - Effusion: difficult to assess secondary to habitus\par - Wounds: none\par - Alignment: normal\par - Tenderness: medial joint line\par - ROM: 0-90 limited by habitus\par - Collateral laxity: none\par - Cruciate laxity: none\par - Popliteal angle (degrees): 30\par - Quad strength: 5/5\par \par Right knee:\par - Soft tissue swelling: difficult to assess secondary to habitus\par - Ecchymosis: none\par - Erythema: none\par - Effusion: difficult to assess secondary to habitus\par - Wounds: none\par - Alignment: normal\par - Tenderness: medial joint line\par - ROM: 0-90 limited by habitus\par - Collateral laxity: none\par - Cruciate laxity: none\par - Popliteal angle (degrees): 30\par - Quad strength: 5/5\par \par Left hip:\par - Swelling: difficult to assess secondary to habitus\par - Ecchymosis: none\par - Erythema: none\par - Wounds: none\par - Tenderness: none\par - ROM: 90 flexion, 0 extension, 5 adduction, 30 abduction, 15 internal rotation, 50 external rotation\par - ALONSO: painless\par - FADIR: painless\par - Royer: negative\par - Stinchfield: negative\par - Flexor power: 5/5\par - Abductor power: 5/5\par \par Right hip:\par - Swelling: difficult to assess secondary to habitus\par - Ecchymosis: none \par - Erythema: none\par - Wounds: none\par - Tenderness: mild anterior and lateral\par - ROM: 60 flexion, 0 extension, 0 adduction, 15 abduction, 0 internal rotation, 25 external rotation\par - ALONSO: painful\par - FADIR: painful\par - Royer: positive\par - Stinchfield: positive\par - Flexor power: 3+/5\par - Abductor power: 3+/5 [de-identified] : A lateral view of the lumbosacral spine, weightbearing AP pelvis, 2 additional views (frog lateral and false profile) of the right hip, and 4 views of the bilateral knees (weightbearing AP, weightbearing Young, weightbearing lateral, and Sunrise) were interpreted by me and reviewed with the patient.\par \par Location of imaging: [facility]\par Date of exam: 2/16/22\par \par Lumbar spine --\par Alignment: positive sagittal balance\par Spondylosis: moderate multilevel\par Listhesis: none\par Posterior elements: moderate multilevel facet arthrosis\par \par Pelvic alignment: relative inlet\par \par Right hip --\par Alignment: normal\par Arthritis: severe\par Deformity: femoral head flattening\par Osteonecrosis: primary vs. secondary femoral head collapse\par \par Left hip --\par Alignment: possible coxa vara\par Arthritis: minimal\par Deformity: none\par Osteonecrosis: none\par \par Right knee --\par Alignment: normal\par Arthritis: tricompartmental worst medial, KL4\par Patellar height: normal\par Patellar tracking: central\par \par Left knee --\par Alignment: normal\par Arthritis: tricompartmental worst medial, KL4\par Patellar height: normal\par Patellar tracking: central

## 2022-04-13 ENCOUNTER — RX RENEWAL (OUTPATIENT)
Age: 67
End: 2022-04-13

## 2022-04-19 ENCOUNTER — FORM ENCOUNTER (OUTPATIENT)
Age: 67
End: 2022-04-19

## 2022-04-20 ENCOUNTER — OUTPATIENT (OUTPATIENT)
Dept: OUTPATIENT SERVICES | Facility: HOSPITAL | Age: 67
LOS: 1 days | End: 2022-04-20
Payer: MEDICARE

## 2022-04-20 ENCOUNTER — RESULT REVIEW (OUTPATIENT)
Age: 67
End: 2022-04-20

## 2022-04-20 DIAGNOSIS — I25.10 ATHEROSCLEROTIC HEART DISEASE OF NATIVE CORONARY ARTERY WITHOUT ANGINA PECTORIS: ICD-10-CM

## 2022-04-20 PROCEDURE — 78452 HT MUSCLE IMAGE SPECT MULT: CPT | Mod: 26

## 2022-04-20 PROCEDURE — A9500: CPT

## 2022-04-20 PROCEDURE — 93016 CV STRESS TEST SUPVJ ONLY: CPT

## 2022-04-20 PROCEDURE — 93018 CV STRESS TEST I&R ONLY: CPT

## 2022-04-20 PROCEDURE — A9505: CPT

## 2022-04-20 PROCEDURE — 78452 HT MUSCLE IMAGE SPECT MULT: CPT

## 2022-04-20 PROCEDURE — 93017 CV STRESS TEST TRACING ONLY: CPT

## 2022-08-26 ENCOUNTER — NON-APPOINTMENT (OUTPATIENT)
Age: 67
End: 2022-08-26

## 2022-08-26 ENCOUNTER — APPOINTMENT (OUTPATIENT)
Dept: ORTHOPEDIC SURGERY | Facility: CLINIC | Age: 67
End: 2022-08-26

## 2022-10-13 ENCOUNTER — NON-APPOINTMENT (OUTPATIENT)
Age: 67
End: 2022-10-13

## 2023-03-22 ENCOUNTER — RX RENEWAL (OUTPATIENT)
Age: 68
End: 2023-03-22

## 2023-04-14 ENCOUNTER — RX RENEWAL (OUTPATIENT)
Age: 68
End: 2023-04-14

## 2023-04-18 ENCOUNTER — RX RENEWAL (OUTPATIENT)
Age: 68
End: 2023-04-18

## 2023-05-16 ENCOUNTER — NON-APPOINTMENT (OUTPATIENT)
Age: 68
End: 2023-05-16

## 2023-05-16 ENCOUNTER — APPOINTMENT (OUTPATIENT)
Dept: HEART AND VASCULAR | Facility: CLINIC | Age: 68
End: 2023-05-16
Payer: MEDICARE

## 2023-05-16 ENCOUNTER — INPATIENT (INPATIENT)
Facility: HOSPITAL | Age: 68
LOS: 0 days | Discharge: HOME | DRG: 309 | End: 2023-05-17
Attending: INTERNAL MEDICINE | Admitting: INTERNAL MEDICINE
Payer: MEDICARE

## 2023-05-16 VITALS
TEMPERATURE: 97 F | HEART RATE: 80 BPM | SYSTOLIC BLOOD PRESSURE: 126 MMHG | OXYGEN SATURATION: 97 % | RESPIRATION RATE: 17 BRPM | DIASTOLIC BLOOD PRESSURE: 72 MMHG | HEIGHT: 63 IN | WEIGHT: 293 LBS

## 2023-05-16 VITALS
HEART RATE: 56 BPM | TEMPERATURE: 96.7 F | DIASTOLIC BLOOD PRESSURE: 76 MMHG | HEIGHT: 63.5 IN | OXYGEN SATURATION: 97 % | SYSTOLIC BLOOD PRESSURE: 142 MMHG

## 2023-05-16 DIAGNOSIS — E78.5 HYPERLIPIDEMIA, UNSPECIFIED: ICD-10-CM

## 2023-05-16 DIAGNOSIS — I48.91 UNSPECIFIED ATRIAL FIBRILLATION: ICD-10-CM

## 2023-05-16 DIAGNOSIS — R73.03 PREDIABETES: ICD-10-CM

## 2023-05-16 DIAGNOSIS — I25.10 ATHEROSCLEROTIC HEART DISEASE OF NATIVE CORONARY ARTERY WITHOUT ANGINA PECTORIS: ICD-10-CM

## 2023-05-16 DIAGNOSIS — I10 ESSENTIAL (PRIMARY) HYPERTENSION: ICD-10-CM

## 2023-05-16 LAB
ALBUMIN SERPL ELPH-MCNC: 3.7 G/DL — SIGNIFICANT CHANGE UP (ref 3.3–5)
ALP SERPL-CCNC: 102 U/L — SIGNIFICANT CHANGE UP (ref 40–120)
ALT FLD-CCNC: 15 U/L — SIGNIFICANT CHANGE UP (ref 10–45)
ANION GAP SERPL CALC-SCNC: 13 MMOL/L — SIGNIFICANT CHANGE UP (ref 5–17)
APTT BLD: 31.6 SEC — SIGNIFICANT CHANGE UP (ref 27.5–35.5)
AST SERPL-CCNC: 15 U/L — SIGNIFICANT CHANGE UP (ref 10–40)
BASOPHILS # BLD AUTO: 0.05 K/UL — SIGNIFICANT CHANGE UP (ref 0–0.2)
BASOPHILS NFR BLD AUTO: 0.6 % — SIGNIFICANT CHANGE UP (ref 0–2)
BILIRUB SERPL-MCNC: 1.2 MG/DL — SIGNIFICANT CHANGE UP (ref 0.2–1.2)
BUN SERPL-MCNC: 22 MG/DL — SIGNIFICANT CHANGE UP (ref 7–23)
CALCIUM SERPL-MCNC: 9 MG/DL — SIGNIFICANT CHANGE UP (ref 8.4–10.5)
CHLORIDE SERPL-SCNC: 108 MMOL/L — SIGNIFICANT CHANGE UP (ref 96–108)
CK MB CFR SERPL CALC: 1 NG/ML — SIGNIFICANT CHANGE UP (ref 0–6.7)
CK SERPL-CCNC: 60 U/L — SIGNIFICANT CHANGE UP (ref 25–170)
CO2 SERPL-SCNC: 20 MMOL/L — LOW (ref 22–31)
CREAT SERPL-MCNC: 0.94 MG/DL — SIGNIFICANT CHANGE UP (ref 0.5–1.3)
EGFR: 67 ML/MIN/1.73M2 — SIGNIFICANT CHANGE UP
EOSINOPHIL # BLD AUTO: 0.24 K/UL — SIGNIFICANT CHANGE UP (ref 0–0.5)
EOSINOPHIL NFR BLD AUTO: 2.9 % — SIGNIFICANT CHANGE UP (ref 0–6)
GLUCOSE SERPL-MCNC: 109 MG/DL — HIGH (ref 70–99)
HCT VFR BLD CALC: 45.6 % — HIGH (ref 34.5–45)
HGB BLD-MCNC: 15 G/DL — SIGNIFICANT CHANGE UP (ref 11.5–15.5)
IMM GRANULOCYTES NFR BLD AUTO: 0.4 % — SIGNIFICANT CHANGE UP (ref 0–0.9)
INR BLD: 1.13 — SIGNIFICANT CHANGE UP (ref 0.88–1.16)
LYMPHOCYTES # BLD AUTO: 1.43 K/UL — SIGNIFICANT CHANGE UP (ref 1–3.3)
LYMPHOCYTES # BLD AUTO: 17.1 % — SIGNIFICANT CHANGE UP (ref 13–44)
MCHC RBC-ENTMCNC: 30.2 PG — SIGNIFICANT CHANGE UP (ref 27–34)
MCHC RBC-ENTMCNC: 32.9 GM/DL — SIGNIFICANT CHANGE UP (ref 32–36)
MCV RBC AUTO: 91.9 FL — SIGNIFICANT CHANGE UP (ref 80–100)
MONOCYTES # BLD AUTO: 0.67 K/UL — SIGNIFICANT CHANGE UP (ref 0–0.9)
MONOCYTES NFR BLD AUTO: 8 % — SIGNIFICANT CHANGE UP (ref 2–14)
NEUTROPHILS # BLD AUTO: 5.95 K/UL — SIGNIFICANT CHANGE UP (ref 1.8–7.4)
NEUTROPHILS NFR BLD AUTO: 71 % — SIGNIFICANT CHANGE UP (ref 43–77)
NRBC # BLD: 0 /100 WBCS — SIGNIFICANT CHANGE UP (ref 0–0)
NT-PROBNP SERPL-SCNC: 3277 PG/ML — HIGH (ref 0–300)
PLATELET # BLD AUTO: 249 K/UL — SIGNIFICANT CHANGE UP (ref 150–400)
POTASSIUM SERPL-MCNC: 4.2 MMOL/L — SIGNIFICANT CHANGE UP (ref 3.5–5.3)
POTASSIUM SERPL-SCNC: 4.2 MMOL/L — SIGNIFICANT CHANGE UP (ref 3.5–5.3)
PROT SERPL-MCNC: 7.1 G/DL — SIGNIFICANT CHANGE UP (ref 6–8.3)
PROTHROM AB SERPL-ACNC: 13.5 SEC — HIGH (ref 10.5–13.4)
RBC # BLD: 4.96 M/UL — SIGNIFICANT CHANGE UP (ref 3.8–5.2)
RBC # FLD: 14.3 % — SIGNIFICANT CHANGE UP (ref 10.3–14.5)
SODIUM SERPL-SCNC: 141 MMOL/L — SIGNIFICANT CHANGE UP (ref 135–145)
TROPONIN T SERPL-MCNC: <0.01 NG/ML — SIGNIFICANT CHANGE UP (ref 0–0.01)
WBC # BLD: 8.37 K/UL — SIGNIFICANT CHANGE UP (ref 3.8–10.5)
WBC # FLD AUTO: 8.37 K/UL — SIGNIFICANT CHANGE UP (ref 3.8–10.5)

## 2023-05-16 PROCEDURE — 92960 CARDIOVERSION ELECTRIC EXT: CPT

## 2023-05-16 PROCEDURE — 75572 CT HRT W/3D IMAGE: CPT | Mod: 26

## 2023-05-16 PROCEDURE — 99214 OFFICE O/P EST MOD 30 MIN: CPT | Mod: 25

## 2023-05-16 PROCEDURE — 93000 ELECTROCARDIOGRAM COMPLETE: CPT

## 2023-05-16 PROCEDURE — 99291 CRITICAL CARE FIRST HOUR: CPT

## 2023-05-16 PROCEDURE — 99222 1ST HOSP IP/OBS MODERATE 55: CPT

## 2023-05-16 PROCEDURE — 71045 X-RAY EXAM CHEST 1 VIEW: CPT | Mod: 26

## 2023-05-16 RX ORDER — ATORVASTATIN CALCIUM 80 MG/1
80 TABLET, FILM COATED ORAL AT BEDTIME
Refills: 0 | Status: DISCONTINUED | OUTPATIENT
Start: 2023-05-16 | End: 2023-05-17

## 2023-05-16 RX ORDER — APIXABAN 2.5 MG/1
5 TABLET, FILM COATED ORAL ONCE
Refills: 0 | Status: COMPLETED | OUTPATIENT
Start: 2023-05-16 | End: 2023-05-16

## 2023-05-16 RX ORDER — METOPROLOL TARTRATE 50 MG
50 TABLET ORAL ONCE
Refills: 0 | Status: COMPLETED | OUTPATIENT
Start: 2023-05-16 | End: 2023-05-16

## 2023-05-16 RX ORDER — METOPROLOL TARTRATE 50 MG
50 TABLET ORAL
Refills: 0 | Status: DISCONTINUED | OUTPATIENT
Start: 2023-05-16 | End: 2023-05-17

## 2023-05-16 RX ORDER — ATORVASTATIN CALCIUM 80 MG/1
1 TABLET, FILM COATED ORAL
Refills: 0 | DISCHARGE

## 2023-05-16 RX ORDER — LOSARTAN POTASSIUM 100 MG/1
25 TABLET, FILM COATED ORAL DAILY
Refills: 0 | Status: DISCONTINUED | OUTPATIENT
Start: 2023-05-17 | End: 2023-05-17

## 2023-05-16 RX ORDER — APIXABAN 2.5 MG/1
5 TABLET, FILM COATED ORAL EVERY 12 HOURS
Refills: 0 | Status: DISCONTINUED | OUTPATIENT
Start: 2023-05-16 | End: 2023-05-17

## 2023-05-16 RX ADMIN — Medication 50 MILLIGRAM(S): at 15:03

## 2023-05-16 RX ADMIN — Medication 50 MILLIGRAM(S): at 18:59

## 2023-05-16 RX ADMIN — APIXABAN 5 MILLIGRAM(S): 2.5 TABLET, FILM COATED ORAL at 19:00

## 2023-05-16 RX ADMIN — APIXABAN 5 MILLIGRAM(S): 2.5 TABLET, FILM COATED ORAL at 13:00

## 2023-05-16 RX ADMIN — ATORVASTATIN CALCIUM 80 MILLIGRAM(S): 80 TABLET, FILM COATED ORAL at 22:02

## 2023-05-16 NOTE — HISTORY OF PRESENT ILLNESS
[FreeTextEntry1] : 67 year old female with h/o obesity, prediabetes, htn, hl, cad, depression who presents for f/up today.\par \par Last seen \par \par notes back pain, sob, cough\par off norvasc for last month as ran out\par  \par ekg today shows new afib w rvr\par \par -Nuclear stress : normal\par \par She denies cp, edema, orthopnea, pnd, palpitations. She denies syncope \par \par Has had extensive w/up for presyncope. \par Seen by neuro and had \par REEG/AMB EEG- normal. Carotid duplex showed no hemodynamically significant stenosis. MRI brain was unremarkable.\par \par Seen by EP who recommended leslee eval which she declined. Also placed holter/event monitor\par Holter/event:  avg HR 59, fluttering corresponded to pac's and short atrial runs\par \par CTA cor's : borderline ectasia of aortic root/asc aorta 3.9 cm, calcium score 614, 98%, LM normal, prox/mid LAD mild stenosis, distal LAD minimal stenosis, D1 normal, D2 minimal stenosis, LCx prox/mid mild stenosis, OM1 small patent, OM2 normal, RCA prox mild stenosis, mid/distal RCA minimal stenosis, RPDA/RPL mild stenosis, ef 50%\par \par \par Seen by Endo for thyroid nodule and hypoglycemia eval. 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)\par prediabetic\par vertigo\par htn\par hl\par bronchitis\par \par \par ALL:\par codeine derivatives\par horse sera agents\par sulfadiazine\par \par \par SH:\par quit tobacco >10 years, had smoked while in 20's\par no etoh\par no drugs\par used to work as  (currently laid off)\par lives alone\par never  \par no children\par \par FH:\par mother -  72 - Rheumatic fever, MI's in 40's\par father -  80's - cabg 60's\par \par MEDS:\par asa 81 mg qd\par lipitor 80 mg qhs\par toprol xl 50 mg qd\par norvasc 10 mg qd\par losartan 25 mg qd\par \par \par \par \par \par \par \par \par \par \par INTERPRETATION:\par CTA (CT ANGIOGRAPHY) of the CHEST, ABDOMEN and PELVIS dated 4/15/2013 10:26\par PM\par \par INDICATION: Back pain, vertigo.\par \par TECHNIQUE: CTA (CT ANGIOGRAPHY) of the chest, abdomen and pelvis was\par performed. Multiplanar images of the chest, abdomen and pelvis were\par reconstructed on an independent work-station. Image postprocessing\par including production of axial images and coronal and sagittal maximum\par intensity projections (MIPs).\par \par CONTRAST USAGE:\par IV contrast: Optiray 350: 125 ml administered; 0 ml discarded.\par Oral contrast: Not administered.\par \par PRIOR STUDIES: None.\par \par FINDINGS: No aortic dissection is seen. 4-cm borderline aneurysmal\par ectasia of the ascending thoracic aorta is noted.. No critical stenoses are\par seen.\par \par Evaluation of the pulmonary arteries is limited due to phase of contrast\par appeared within that limitation no central pulmonary embolism is seen. The\par segmental and subsegmental pulmonary arteries cannot be evaluated for the\par presence of absence of pulmonary embolism on this examination. Dilated\par main pulmonary artery suggesting pulmonary arterial hypertension.\par \par The heart is normal in size. Coronary artery calcifications. No\par pericardial effusion is seen. 1 cm in short axis right paratracheal lymph\par node. Up to 1 cm in short axis left axillary nodes.\par \par Evaluation of pulmonary parenchyma demonstrates mild dependent changes in\par both lungs. Faint groundglass opacities bilaterally may represent alveolar\par edema, nonspecific alveolitis or infiltrates. No pleural effusions are\par seen.\par \par Images of the upper abdomen demonstrate diffuse low-density liver\par consistent with fatty infiltration of the liver. Hepatomegaly showing 21.2\par cm in cephalocaudal length. No radiopaque stones are seen in the\par gallbladder. The pancreas is normal in appearance. No splenic\par abnormalities are seen.\par \par The adrenal glands are unremarkable. The kidneys are normal in appearance.\par No lymphadenopathy is seen.\par \par Evaluation of the bowel is limited without oral contrast. Within that\par limitation, these images demonstrate no bowel obstruction or free\par intraperitoneal air. Fat containing umbilical hernia is noted. Radiopaque\par rounded densities within the bowel consistent with ingested food,\par medications or other substances. Normal appendix. No ascites is seen.\par \par Images of the pelvis demonstrate the nodular uterine fundus anteriorly\par compatible with a fibroid uterus. No adnexal masses. Unremarkable\par appearance of the urinary bladder. Small bilateral inguinal and external\par iliac lymph nodes are seen.\par \par Evaluation of the osseous structures demonstrates scattered degenerative\par changes, most prominent in the right hip.\par \par IMPRESSION:\par No evidence of aortic dissection. 4-cm borderline aneurysmal ectasia of\par the ascending thoracic aorta Dilated main pulmonary artery suggesting\par pulmonary artery hypertension.\par \par Faint groundglass opacities bilaterally may represent alveolar edema,\par nonspecific alveolitis or infiltrates.\par \par Reviewed by [id number] Conner Colon MD\par \par Reviewed by [id number] Conner Colon MD and Signed by [id number] Christi BRAUN.\par MD Drea; 2013 12:08 AM\par DICTATED: 2013\par CHRISTI VIEYRA MD 2013 \par

## 2023-05-16 NOTE — H&P ADULT - NSICDXFAMILYHX_GEN_ALL_CORE_FT
FAMILY HISTORY:  Father  Still living? No  FH: CABG (coronary artery bypass surgery), Age at diagnosis: Age Unknown    Mother  Still living? Unknown  FHx: early MI, Age at diagnosis: Age Unknown

## 2023-05-16 NOTE — DISCUSSION/SUMMARY
[Patient] : the patient [___ Month(s)] : in [unfilled] month(s) [FreeTextEntry1] : 67 year old female with h/o morbid obesity, htn, hl, cad, + family h/o cad, prediabetes, depression presents for f/up today for sob\par \par -ekg ordered today - afib w rvr\par -will send to ER for echo, dccv, rate control, anticoagulation, EP evaluation, labs\par \par \par -endocrine f/up\par -will plan to dc asa and start anticoagulation for afib\par -continue losartan, toprol, norvasc\par -CTA cor's 5/17 mild nonobstructive 3vd\par -continue lipitor 80 qhs, consider add zetia or pcsk9  \par -Nuclear stress 4/22: normal (calcium score 2017 over 600) \par -weight loss, diet, exercise (not interested in bariatric surgery)\par -Echo 3/17 showed normal ef 55%, no wma, no significant valvular disease\par -EP recs, prior monitor results showed pac's, short run AT, new afib \par -neuro w/up for presyncope unrevealing with normal eeg, mri, carotid\par -blood sugar monitoring\par -labs 2021 reviewed\par -f/up 1 month for cad, afib\par \par I have spent 30 minutes reviewing labs, records, tests and discussing bp control, cvd risk factor, cad/lipid, afib management\par

## 2023-05-16 NOTE — ED ADULT TRIAGE NOTE - CHIEF COMPLAINT QUOTE
Pt BIBEMS from doctors office for new onset afib, c/o dizziness, chest pressure, sob. Pt received 40mg Cardizem, 500cc NS PTA. 20g L hand IV placed by EMS. Pt denies cp, numbness/tingling. Received on 2L NC. Placed on CCM. EKG in progress.

## 2023-05-16 NOTE — ED PROVIDER NOTE - CLINICAL SUMMARY MEDICAL DECISION MAKING FREE TEXT BOX
67-year-old female presenting with new onset A-fib, currently rate controlled status post 40 mg of IV diltiazem.  Currently in atrial fibrillation, heart rate of 87, currently asymptomatic.  Will require AC, will check labs, chest x-ray, admit to cardiology.

## 2023-05-16 NOTE — ED ADULT NURSE NOTE - NSFALLUNIVINTERV_ED_ALL_ED
Bed/Stretcher in lowest position, wheels locked, appropriate side rails in place/Call bell, personal items and telephone in reach/Instruct patient to call for assistance before getting out of bed/chair/stretcher/Non-slip footwear applied when patient is off stretcher/[place] to call system/Physically safe environment - no spills, clutter or unnecessary equipment/Purposeful proactive rounding/Room/bathroom lighting operational, light cord in reach

## 2023-05-16 NOTE — ED PROVIDER NOTE - PHYSICAL EXAMINATION
general: Well appearing, in no acute distress  HEENT: Normocephalic, atraumatic, extraocular movements intact  CV: Regular rate  Pulm: No respiratory distress, no tachypnea  Abd: Flat, no gross distension  Ext: warm and well perfused  Skin: No gross rashes or lesions  Neuro: Alert and oriented, moving all extremities

## 2023-05-16 NOTE — H&P ADULT - NSHPADDITIONALINFOADULT_GEN_ALL_CORE
Attending Attestation:  I was physically present for the key portions of the evaluation and management (E/M) service provided.  I agree with the above history, physical, and plan which I have reviewed with the following edits/addendum:    67F w HTN HLP nonobstructive CAD presenting from Cardiology office in newly diagnosed AFib. Has been having RICHARDS recently. No palps, CP. Euvolemic on exam. Plan for CCTA to rule out ALISHA thrombus prior to cardioversion. Continue home cardiac meds including metoprolol. Start apixaban 5 mg bid.    Aniket Wood MD  Cardiology    50 minutes spent on total encounter; more than 50% of the visit was spent counseling and/or coordinating care by the attending physician.

## 2023-05-16 NOTE — H&P ADULT - ASSESSMENT
66 yo F,  morbidly obese,  FMH of early  CAD (Mother MI's in 40's, father  - cabg 60's), PMH  prediabetes, HTN, HL, non obstructive CAD (normal NST 4/2022), depression who was sent to [facility] ED by her cardiologist for evaluation and management of new A fib.   68 yo F,  morbidly obese,  FMH of early  CAD (Mother MI's in 40's, father  - CABG 60's), PMH  prediabetes, HTN, HL, non obstructive CAD (normal NST 4/2022), depression who was sent to [facility] ED by her cardiologist for evaluation and management of new A fib.

## 2023-05-16 NOTE — H&P ADULT - CARDIOVASCULAR
no murmur/no JVD/Irregularly irregular rhythm no murmur/no JVD/no pedal edema/Irregularly irregular rhythm

## 2023-05-16 NOTE — CONSULT NOTE ADULT - ASSESSMENT
66 yo F,  morbidly obese, with history of HTN, HLD, intermittent palpitations was sent to [facility] Ed from her cardiologist office with newly diagnosed atrial fibrillation . She is rate controlled after diltiazem iv in her cardiologist office.  Plan for JOSHUA/DCCV to restore sinus rhythm, would do echocardiogram to evaluate EF , start Eliquis 5 mg bid, rate control with diltiazem 30 mg q 6h.     66 yo F ,  morbidly obese, with history of HTN, HLD,CAD, depression,  intermittent palpitations was sent to [facility] Ed from her cardiologist Dr. Carlos office with newly diagnosed atrial fibrillation . She is rate controlled after diltiazem iv in her cardiologist office.  Plan for JOSHUA/DCCV to restore sinus rhythm, would do echocardiogram to evaluate EF , start Eliquis 5 mg bid, rate control with diltiazem 30 mg q 6h. , consider evaluation for ROHAN as outpatient     68 yo F ,  morbidly obese, with history of HTN, HLD,CAD, depression,  intermittent palpitations was sent to [facility] Ed from her cardiologist Dr. Carlos office with newly diagnosed atrial fibrillation . She is rate controlled after diltiazem iv in her cardiologist office.  Plan for JOSHUA/DCCV to restore sinus rhythm, would do echocardiogram to evaluate EF , start Eliquis 5 mg bid, rate control with diltiazem 30 mg q 6h.. Consider evaluation for ROHAN as outpatient

## 2023-05-16 NOTE — ED PROVIDER NOTE - OBJECTIVE STATEMENT
67-year-old female with history of hypertension, hyperlipidemia, morbid obesity presenting from outpatient cardiologist for new onset A-fib.  Patient endorsing 3 months of intermittent chest pressure and shortness of breath, worse with lying flat, nonexertional nonpleuritic.  Saw her cardiologist and was noted to be in rapid A-fib, given 40 mg of IV diltiazem in the clinic.  Patient states she feels better now.  No chest pain or shortness of breath, no fever or chills, no leg pain or leg swelling, no recent travel, no history of DVT or PE.  No history of A-fib.  ROS as above.

## 2023-05-16 NOTE — H&P ADULT - PROBLEM SELECTOR PLAN 2
hx none  obstructive CAD   will stop asa ( as per referring cards) as patient is on eliquis now for A fib       continue statin  BB on hold for now given cardizem for rate control , re-evaluate on discharge hx none  obstructive CAD , troponin neg, last NST 4/22 - normal   will stop asa ( as per referring cards) as patient is on eliquis now for A fib     F/UP TTE in am   continue statin  continue BB

## 2023-05-16 NOTE — ED ADULT NURSE NOTE - OBJECTIVE STATEMENT
Patient presents to the ED from doctor's office for rapid-afib. Patient reports that she had gone to her doctor for her appointment and her heart rate was elevated. Patient reports she has been having on and off chest pain for three months and feeling dizzy for years. Reports history of HTN. Patient presents to the ED from doctor's office for rapid-afib. Patient reports that she had gone to her doctor for her appointment and her heart rate was elevated. Patient reports she has been having on and off chest pain for three months and feeling dizzy for years. Reports history of HTN. Patient's heart rate with EMS was in the 140s, patient was given 40mg of IV cardizem. Denies any chest pain at this time.

## 2023-05-16 NOTE — H&P ADULT - PROBLEM SELECTOR PLAN 1
started eliquis in ED for  [id number] at least 3  continue cardizem 30mg po q6h given great response with rate control and  hx nl EF last year     as per EP can do DCCV today if cardiac CTA with ALISHA is negative for clot - patient is NPO started eliquis in ED for  [id number] at least 3  continue cardizem 30mg po q6h given great response with rate control and  hx nl EF last year     as per EP can do DCCV today if CTA heart with ALISHA is negative for clot - patient is NPO started eliquis in ED for  [id number] at least 3  rate controlled at present   continue metoprolol 50mg po q12h as d/w attending (not cardizem until EF is conformed by TTE in am )     as per EP can do DCCV today if CTA heart with ALISHA is negative for clot - patient is NPO started eliquis in ED for  [id number] at least 3  rate controlled at present   continue metoprolol 50mg po q12h as d/w attending (not cardizem until EF is conformed by TTE in am )     as per EP can do DCCV today if CTA heart with ALISHA is negative for clot - patient is NPO    F/UP TTE in am

## 2023-05-16 NOTE — H&P ADULT - NSICDXPASTMEDICALHX_GEN_ALL_CORE_FT
PAST MEDICAL HISTORY:  Afib     CAD (coronary artery disease)     History of morbid obesity     HTN (hypertension)     Hyperlipidemia     Prediabetes

## 2023-05-16 NOTE — PATIENT PROFILE ADULT - PATIENT'S PREFERRED PRONOUN
Pt presents to the ED c/o  right flank pain that began 2 days ago.  She denies hematuria or dysuria but is noted a intermittent but increasing right flank pain that radiates down to her right lower quadrant.  She states nothing makes it better or worse and she has had nausea without vomiting.  She states that she recently started hemodialysis secondary to renal failure from her diabetes.       On exam,   Her heart is regular rate and rhythm without murmur.  Lungs are clear to auscultation bilaterally.  Her abdomen is NABS, soft, nontender nondistended.  She does not have CVA tenderness.  She does have a dialysis catheter in her right upper chest wall.  It is clean dry intact.     Plan: I agree with plan of checking urine, blood work and a CT of abdomen pelvis to rule out kidney stone or pyelonephritis.  Patient's differential diagnosis includes right lower lobe pneumonia, pyelonephritis, renal colic and appendicitis amongst others.      Patient was placed in face mask in first look. Patient was wearing facemask when I entered the room and throughout our encounter. I wore full protective equipment throughout this patient encounter including a face mask, eye shield and gloves. Hand hygiene was performed before donning protective equipment and after removal when leaving the room.       Attestation:  The AMA and I have discussed this patient's history, physical exam, and treatment plan.  I have reviewed the documentation and personally had a face to face interaction with the patient.  I provided a substantial portion of the care of this patient.  I personally performed the physical exam, in its entirety.  I affirm the documentation and agree with the treatment and plan.  The attached note describes my personal findings.            Gamal Cody MD  01/09/22 [id number]     Her/She

## 2023-05-16 NOTE — H&P ADULT - HISTORY OF PRESENT ILLNESS
INCOMPLETE    66 yo F ,  morbidly obese,  FMH of early  CAD (Mother MI's in 40's, father  - cabg 60's), PMH  prediabetes, HTN, HL, non obstructive CAD (normal NST 4/2022), depression who was sent to [facility] ED by her cardiologist for new A fib.  Patient presented to her cardiologhist today for follow up and endorsed SOB     office ekg today showed new Afib RVR, given 40mg IV cardizem by EMS  with VR down to 80's and referred to ED for further management and DCCV    ROS negative for cp, edema, orthopnea, pnd, palpitations. She denies syncope     MEDS:  asa 81 mg qd  lipitor 80 mg qhs  toprol xl 50 mg qd  norvasc 10 mg qd  losartan 25 mg qd                 68 yo F,  morbidly obese,  FMH of early  CAD (Mother MI's in 40's, father  - cabg 60's), PMH  prediabetes, HTN, HL, non obstructive CAD (normal NST 4/2022,), depression who was sent to [facility] ED by her cardiologist for new A fib.  Patient presented to her cardiologhist today for follow up and endorsed SOB, palpitations and chest heaviness, office ekg today showed new Afib RVR, given 40mg IV cardizem by EMS  with VR down to 80's and referred to ED for further management and DCCV    ROS negative for LE  edema, orthopnea, PND, syncope       PRIOR STUDIES ( from [facility])     -CTA cor's 5/17 mild nonobstructive 3vd, EF 50%  -Nuclear stress 4/22: normal (calcium score 2017 over 600)   -Echo 3/17 showed normal EF 55%, no wma, no significant valvular disease  -EP prior monitor results showed pac's, short run AT  -prior neuro w/up for presyncope unrevealing with normal eeg, mri, carotid       66 yo F,  morbidly obese,  FMH of early  CAD (Mother MI's in 40's, father  - cabg 60's), PMH  prediabetes, HTN, HL, non obstructive CAD (normal NST 4/2022,), depression who was sent to [facility] ED by her cardiologist for new A fib.  Patient presented to her cardiologhist today for follow up and endorsed SOB, palpitations and chest heaviness, office ekg today showed new Afib RVR, given 40mg IV cardizem by EMS  with VR down to 80's and referred to ED for further management and DCCV    ROS negative for LE  edema, orthopnea, PND, syncope     in ED: A fib VR 80's, 118/60, RR 20, O2 sat 97% RA   EKG A fib VR 80's  seen by EP - plan for DCCV after CTA with ALISHA  (to R/O ALISHA clot) as patient will otherwise require anesthesia present for JOSHUA   started eliquis in ED ( 5mg given at 13:00)    admitted to cards Dr Wood       PRIOR STUDIES ( from [facility])     -CTA cor's 5/17 mild nonobstructive 3vd, EF 50%  -Nuclear stress 4/22: normal (calcium score 2017 over 600)   -Echo 3/17 showed normal EF 55%, no wma, no significant valvular disease  -EP prior monitor results showed pac's, short run AT  -prior neuro w/up for presyncope unrevealing with normal eeg, mri, carotid       68 yo F,  morbidly obese,  FMH of early  CAD (Mother MI's in 40's, father  - cabg 60's), PMH  prediabetes, HTN, HL, non obstructive CAD (normal NST 4/2022), depression who was sent to [facility] ED by her cardiologist for evaluation and management of new A fib.  Patient presented to her cardiologist  today for follow up and endorsed SOB, palpitations and chest heaviness, office ekg today showed new Afib RVR, given 40mg IV cardizem by EMS  with VR down to 80's and referred to ED for further management and DCCV    ROS negative for LE  edema, orthopnea, PND, syncope     in ED: A fib VR 80's, 118/60, RR 20, O2 sat 97% RA   EKG A fib VR 80's  seen by EP - plan for DCCV after CTA with ALISHA  (to R/O ALISHA clot) as patient will otherwise require anesthesia present for JOSHUA   started eliquis in ED ( 5mg given at 13:00)    admitted to cards Dr Wood       [facility]     -CTA cor's 5/17 mild nonobstructive 3vd, EF 50%  -Nuclear stress 4/22: normal (calcium score 2017 over 600)   -Echo 3/17 showed normal EF 55%, no wma, no significant valvular disease  -EP prior monitor results showed pac's, short run AT  -prior neuro w/up for presyncope unrevealing with normal eeg, mri, carotid       66 yo F,  morbidly obese,  FMH of early  CAD (Mother MI's in 40's, father  - CABG 60's), PMH  prediabetes, HTN, HL, non obstructive CAD (normal NST 4/2022), depression who was sent to [facility] ED by her cardiologist for evaluation and management of new A fib.  Patient presented to her cardiologist  today for follow up and endorsed SOB, palpitations and chest heaviness, office ekg today showed new Afib RVR, given 40mg IV cardizem by EMS  with VR down to 80's and referred to ED for further management and DCCV    ROS negative for LE  edema, orthopnea, PND, syncope     in ED: A fib VR 80's, 118/60, RR 20, O2 sat 97% RA   EKG A fib VR 80's  seen by EP - plan for DCCV after CTA with ALISHA  (to R/O ALISHA clot) as patient will otherwise require anesthesia present for JOSHUA   started eliquis in ED ( 5mg given at 13:00)    admitted to cards Dr Wood       [facility]     -CTA cor's 5/17 mild nonobstructive 3vd, EF 50%  -Nuclear stress 4/22: normal (calcium score 2017 over 600)   -Echo 3/17 showed normal EF 55%, no wma, no significant valvular disease  -EP prior monitor results showed pac's, short run AT  -prior neuro w/up for presyncope unrevealing with normal eeg, mri, carotid       68 yo F,  morbidly obese,  FMH of early  CAD (Mother MI's in 40's, father  - CABG 60's), PMH  prediabetes, HTN, HL, non obstructive CAD (normal NST 4/2022), depression who was sent to [facility] ED by her cardiologist for evaluation and management of new A fib.  Patient presented to her cardiologist  today for follow up and endorsed SOB, palpitations and chest heaviness, office ekg today showed new Afib RVR, given 40mg IV cardizem by EMS  with VR down to 80's and referred to ED for further management and DCCV    ROS negative for LE  edema, orthopnea, PND, syncope     in ED: A fib VR 80's, 118/60, RR 20, O2 sat 97% RA   EKG A fib VR 80's  CXR portable : ? PVC (poor study, looks overexposed), lungs CTA and sat 97% on RA, able to lie flat - no IV lasix given       seen by EP - plan for DCCV after CTA with ALISHA  (to R/O ALISHA clot) as patient will otherwise require anesthesia present for JOSHUA   started eliquis in ED ( 5mg given at 13:00)    admitted to cards Dr Wood       PRIOR STUDIES ( from [facility])     -CTA cor's 5/17 mild nonobstructive 3vd, EF 50%  -Nuclear stress 4/22: normal (calcium score 2017 over 600)   -Echo 3/17 showed normal EF 55%, no wma, no significant valvular disease  -EP prior monitor results showed pac's, short run AT  -prior neuro w/up for presyncope unrevealing with normal eeg, mri, carotid

## 2023-05-16 NOTE — ED PROVIDER NOTE - PROGRESS NOTE DETAILS
PEDRO VASC of 3, will give eliquis. PEDRO VASC of 3, will give eliquis. Dr. Erazo will cardiovert today.

## 2023-05-16 NOTE — H&P ADULT - PROBLEM SELECTOR PLAN 3
normotensive, continue losartan 25mg   holding home norvasc/toprol XL 50  for now with cardizem on board for rate control , re-evaluate on discharge normotensive, continue losartan 25mg and metoprolol  50mg BID for now  norvasc is on hold for now, re-evaluate on discharge

## 2023-05-16 NOTE — H&P ADULT - NSHPSOCIALHISTORY_GEN_ALL_CORE
quit tobacco >10 years, had smoked while in 20's  no etoh  no drugs  used to work as  (currently laid off)  lives alone

## 2023-05-16 NOTE — H&P ADULT - NSHPPHYSICALEXAM_GEN_ALL_CORE
NAD  feels better with  rate control    Vital Signs Last 24 Hrs  T(C): 36.7 (16 May 2023 14:01), Max: 36.7 (16 May 2023 13:26)  T(F): 98 (16 May 2023 14:01), Max: 98 (16 May 2023 13:26)  HR: 71 (16 May 2023 14:01) (71 - 80)  BP: 118/64 (16 May 2023 14:01) (101/65 - 126/72)  BP(mean): --  RR: 18 (16 May 2023 14:01) (17 - 18)  SpO2: 97% RA

## 2023-05-16 NOTE — CONSULT NOTE ADULT - SUBJECTIVE AND OBJECTIVE BOX
Electrophysiology Consult Note:     CHIEF COMPLAINT:  Patient is a 67y old  Female who presents with a chief complaint of       HISTORY OF PRESENT ILLNESS:   HPI: 68 yo F ,  morbidly obese, with history of HTN, HLD, intermittent palpitations was sent to [facility] Ed from her cardiologist office with newly diagnosed atrial fibrillation .  Patient states that she has been having palpitations for the last few month, she feels palpitations mostly when she is laying  down, she denies SOB, dizziness, syncope. Today she saw her cardiologist and was noted to be in atrial fibrillation with RVR.   She was given diltiazem IV , her HR in now controlled, she was brought to ED.   EPS was called to evaluate for        PAST MEDICAL & SURGICAL HISTORY:      FAMILY HISTORY:      SOCIAL HISTORY:    [x ] Non-smoker      Allergies    horse sera containing agents (Unknown)  sulfADIAZINE (Unknown)  morphine (Other)  Darvon (Unknown)    Intolerances    	    MEDICATIONS:  MEDICATIONS  (STANDING):    MEDICATIONS  (PRN):        REVIEW OF SYSTEMS:  CONSTITUTIONAL: No fever, weight loss, or fatigue  EYES: No eye pain, visual disturbances, or discharge  ENMT:  No difficulty hearing, tinnitus, vertigo; No sinus or throat pain  NECK: No pain or stiffness  BREASTS: No pain, masses, or nipple discharge  RESPIRATORY: No cough, wheezing, chills or hemoptysis; No Shortness of Breath  CARDIOVASCULAR: No chest pain, palpitations, dizziness, or leg swelling  GASTROINTESTINAL: No abdominal or epigastric pain. No nausea, vomiting, or hematemesis; No diarrhea or constipation.     PHYSICAL EXAM:  Vital Signs Last 24 Hrs  T(C): 36.1 (16 May 2023 11:36), Max: 36.1 (16 May 2023 11:36)  T(F): 97 (16 May 2023 11:36), Max: 97 (16 May 2023 11:36)  HR: 80 (16 May 2023 11:36) (80 - 80)  BP: 126/72 (16 May 2023 11:36) (126/72 - 126/72)  BP(mean): --  RR: 17 (16 May 2023 11:36) (17 - 17)  SpO2: 97% (16 May 2023 11:36) (97% - 97%)    Parameters below as of 16 May 2023 11:36  Patient On (Oxygen Delivery Method): nasal cannula  O2 Flow (L/min): 2    Daily Height in cm: 160.02 (16 May 2023 11:36)    Daily     Constitutional: NAD	  HEENT:   PERRL, EOMI	  CVS:  S1 S2, No JVD,  No edema  Pulm: Lungs clear to auscultation	  GI:  Soft, Non-tender, + BS	  Ext: No LE edema  Neurologic: A&O x 3  Skin: No rash or lesion       	  LABS:	                         15.0   8.37  )-----------( 249      ( 16 May 2023 12:04 )             45.6     05-16    141  |  108  |  22  ----------------------------<  109<H>  4.2   |  20<L>  |  0.94    Ca    9.0      16 May 2023 12:04    TPro  7.1  /  Alb  3.7  /  TBili  1.2  /  DBili  x   /  AST  15  /  ALT  15  /  AlkPhos  102  05-16    proBNP:   Lipid Profile:   HgA1c:   TSH: 	      Telemetry: atrial fibrillation     Echo: PND    Electrophysiology Consult Note:     CHIEF COMPLAINT:  Patient is a 67y old  Female who presents with a chief complaint of       HISTORY OF PRESENT ILLNESS:   HPI: 68 yo F ,  morbidly obese, with history of HTN, HLD,CAD, depression,  intermittent palpitations was sent to [facility] Ed from her cardiologist Dr. Carlos office with newly diagnosed atrial fibrillation .  Patient states that she has been having palpitations for the last few month, she feels palpitations mostly when she is laying  down, she denies SOB, dizziness, syncope. Today she saw her cardiologist and was noted to be in atrial fibrillation with RVR.   She was given diltiazem IV , her HR in now controlled, she was brought to ED.   EPS was called to evaluate for        PAST MEDICAL & SURGICAL HISTORY:      FAMILY HISTORY:      SOCIAL HISTORY:    [x ] Non-smoker      Allergies    horse sera containing agents (Unknown)  sulfADIAZINE (Unknown)  morphine (Other)  Darvon (Unknown)    Intolerances    	    MEDICATIONS:  MEDICATIONS  (STANDING):    MEDICATIONS  (PRN):        REVIEW OF SYSTEMS:  CONSTITUTIONAL: No fever, weight loss, or fatigue  EYES: No eye pain, visual disturbances, or discharge  ENMT:  No difficulty hearing, tinnitus, vertigo; No sinus or throat pain  NECK: No pain or stiffness  BREASTS: No pain, masses, or nipple discharge  RESPIRATORY: No cough, wheezing, chills or hemoptysis; No Shortness of Breath  CARDIOVASCULAR: No chest pain, palpitations, dizziness, or leg swelling  GASTROINTESTINAL: No abdominal or epigastric pain. No nausea, vomiting, or hematemesis; No diarrhea or constipation.     PHYSICAL EXAM:  Vital Signs Last 24 Hrs  T(C): 36.1 (16 May 2023 11:36), Max: 36.1 (16 May 2023 11:36)  T(F): 97 (16 May 2023 11:36), Max: 97 (16 May 2023 11:36)  HR: 80 (16 May 2023 11:36) (80 - 80)  BP: 126/72 (16 May 2023 11:36) (126/72 - 126/72)  BP(mean): --  RR: 17 (16 May 2023 11:36) (17 - 17)  SpO2: 97% (16 May 2023 11:36) (97% - 97%)    Parameters below as of 16 May 2023 11:36  Patient On (Oxygen Delivery Method): nasal cannula  O2 Flow (L/min): 2    Daily Height in cm: 160.02 (16 May 2023 11:36)    Daily     Constitutional: NAD	  HEENT:   PERRL, EOMI	  CVS:  S1 S2, No JVD,  No edema  Pulm: Lungs clear to auscultation	  GI:  Soft, Non-tender, + BS	  Ext: No LE edema  Neurologic: A&O x 3  Skin: No rash or lesion       	  LABS:	                         15.0   8.37  )-----------( 249      ( 16 May 2023 12:04 )             45.6     05-16    141  |  108  |  22  ----------------------------<  109<H>  4.2   |  20<L>  |  0.94    Ca    9.0      16 May 2023 12:04    TPro  7.1  /  Alb  3.7  /  TBili  1.2  /  DBili  x   /  AST  15  /  ALT  15  /  AlkPhos  102  05-16    proBNP:   Lipid Profile:   HgA1c:   TSH: 	      Telemetry: atrial fibrillation     Echo: PND    Electrophysiology Consult Note:     CHIEF COMPLAINT:  Patient is a 67y old  Female who presents with a chief complaint of       HISTORY OF PRESENT ILLNESS:   HPI: 66 yo F ,  morbidly obese, with history of HTN, HLD,CAD, depression,  intermittent palpitations was sent to [facility] Ed from her cardiologist Dr. Carlos office with newly diagnosed atrial fibrillation .  Patient states that she has been having palpitations for the last few month, she feels palpitations mostly when she is laying  down, she denies SOB, dizziness, syncope. Today she saw her cardiologist and was noted to be in atrial fibrillation with RVR.   She was given diltiazem IV , her HR in now controlled, she was brought to ED.   EPS was called to evaluate for  atrial fibrillation       PAST MEDICAL & SURGICAL HISTORY:      FAMILY HISTORY:      SOCIAL HISTORY:    [x ] Non-smoker      Allergies    horse sera containing agents (Unknown)  sulfADIAZINE (Unknown)  morphine (Other)  Darvon (Unknown)    Intolerances    	    MEDICATIONS:  MEDICATIONS  (STANDING):    MEDICATIONS  (PRN):        REVIEW OF SYSTEMS:  CONSTITUTIONAL: No fever, weight loss, or fatigue  EYES: No eye pain, visual disturbances, or discharge  ENMT:  No difficulty hearing, tinnitus, vertigo; No sinus or throat pain  NECK: No pain or stiffness  BREASTS: No pain, masses, or nipple discharge  RESPIRATORY: No cough, wheezing, chills or hemoptysis; No Shortness of Breath  CARDIOVASCULAR: No chest pain, palpitations, dizziness, or leg swelling  GASTROINTESTINAL: No abdominal or epigastric pain. No nausea, vomiting, or hematemesis; No diarrhea or constipation.     PHYSICAL EXAM:  Vital Signs Last 24 Hrs  T(C): 36.1 (16 May 2023 11:36), Max: 36.1 (16 May 2023 11:36)  T(F): 97 (16 May 2023 11:36), Max: 97 (16 May 2023 11:36)  HR: 80 (16 May 2023 11:36) (80 - 80)  BP: 126/72 (16 May 2023 11:36) (126/72 - 126/72)  BP(mean): --  RR: 17 (16 May 2023 11:36) (17 - 17)  SpO2: 97% (16 May 2023 11:36) (97% - 97%)    Parameters below as of 16 May 2023 11:36  Patient On (Oxygen Delivery Method): nasal cannula  O2 Flow (L/min): 2    Daily Height in cm: 160.02 (16 May 2023 11:36)    Daily     Constitutional: NAD	  HEENT:   PERRL, EOMI	  CVS:  S1 S2, No JVD,  No edema  Pulm: Lungs clear to auscultation	  GI:  Soft, Non-tender, + BS	  Ext: No LE edema  Neurologic: A&O x 3  Skin: No rash or lesion       	  LABS:	                         15.0   8.37  )-----------( 249      ( 16 May 2023 12:04 )             45.6     05-16    141  |  108  |  22  ----------------------------<  109<H>  4.2   |  20<L>  |  0.94    Ca    9.0      16 May 2023 12:04    TPro  7.1  /  Alb  3.7  /  TBili  1.2  /  DBili  x   /  AST  15  /  ALT  15  /  AlkPhos  102  05-16    proBNP:   Lipid Profile:   HgA1c:   TSH: 	      Telemetry: atrial fibrillation     Echo: PND    Electrophysiology Consult Note:     CHIEF COMPLAINT:  Patient is a 67y old  Female who presents with a chief complaint of atrial fibrillation        HISTORY OF PRESENT ILLNESS:   HPI: 66 yo F ,  morbidly obese, with history of HTN, HLD,CAD, depression,  intermittent palpitations was sent to [facility] Ed from her cardiologist Dr. Carlos office with newly diagnosed atrial fibrillation .  Patient states that she has been having palpitations for the last few month, she feels palpitations mostly when she is laying  down, she denies SOB, dizziness, syncope. Today she saw her cardiologist and was noted to be in atrial fibrillation with RVR.   She was given diltiazem IV , her HR in now controlled, she was brought to ED.   EPS was called to evaluate for  atrial fibrillation       PAST MEDICAL & SURGICAL HISTORY:      FAMILY HISTORY:      SOCIAL HISTORY:    [x ] Non-smoker      Allergies    horse sera containing agents (Unknown)  sulfADIAZINE (Unknown)  morphine (Other)  Darvon (Unknown)    Intolerances    	    MEDICATIONS:  MEDICATIONS  (STANDING):    MEDICATIONS  (PRN):        REVIEW OF SYSTEMS:  CONSTITUTIONAL: No fever, weight loss, or fatigue  EYES: No eye pain, visual disturbances, or discharge  ENMT:  No difficulty hearing, tinnitus, vertigo; No sinus or throat pain  NECK: No pain or stiffness  BREASTS: No pain, masses, or nipple discharge  RESPIRATORY: No cough, wheezing, chills or hemoptysis; No Shortness of Breath  CARDIOVASCULAR: No chest pain, palpitations, dizziness, or leg swelling  GASTROINTESTINAL: No abdominal or epigastric pain. No nausea, vomiting, or hematemesis; No diarrhea or constipation.     PHYSICAL EXAM:  Vital Signs Last 24 Hrs  T(C): 36.1 (16 May 2023 11:36), Max: 36.1 (16 May 2023 11:36)  T(F): 97 (16 May 2023 11:36), Max: 97 (16 May 2023 11:36)  HR: 80 (16 May 2023 11:36) (80 - 80)  BP: 126/72 (16 May 2023 11:36) (126/72 - 126/72)  BP(mean): --  RR: 17 (16 May 2023 11:36) (17 - 17)  SpO2: 97% (16 May 2023 11:36) (97% - 97%)    Parameters below as of 16 May 2023 11:36  Patient On (Oxygen Delivery Method): nasal cannula  O2 Flow (L/min): 2    Daily Height in cm: 160.02 (16 May 2023 11:36)    Daily     Constitutional: NAD	  HEENT:   PERRL, EOMI	  CVS:  S1 S2, No JVD,  No edema  Pulm: Lungs clear to auscultation	  GI:  Soft, Non-tender, + BS	  Ext: No LE edema  Neurologic: A&O x 3  Skin: No rash or lesion       	  LABS:	                         15.0   8.37  )-----------( 249      ( 16 May 2023 12:04 )             45.6     05-16    141  |  108  |  22  ----------------------------<  109<H>  4.2   |  20<L>  |  0.94    Ca    9.0      16 May 2023 12:04    TPro  7.1  /  Alb  3.7  /  TBili  1.2  /  DBili  x   /  AST  15  /  ALT  15  /  AlkPhos  102  05-16    proBNP:   Lipid Profile:   HgA1c:   TSH: 	      Telemetry: atrial fibrillation     Echo: PND

## 2023-05-16 NOTE — PATIENT PROFILE ADULT - FALL HARM RISK - RISK INTERVENTIONS
Assistance OOB with selected safe patient handling equipment/Assistance with ambulation/Communicate Fall Risk and Risk Factors to all staff, patient, and family/Monitor gait and stability/Reinforce activity limits and safety measures with patient and family/Sit up slowly, dangle for a short time, stand at bedside before walking/Use of alarms - bed, chair and/or voice tab/Visual Cue: Yellow wristband/Bed in lowest position, wheels locked, appropriate side rails in place/Call bell, personal items and telephone in reach/Instruct patient to call for assistance before getting out of bed or chair/Non-slip footwear when patient is out of bed/[place] to call system/Physically safe environment - no spills, clutter or unnecessary equipment/Purposeful Proactive Rounding/Room/bathroom lighting operational, light cord in reach

## 2023-05-16 NOTE — H&P ADULT - NSHPLABSRESULTS_GEN_ALL_CORE
15.0   8.37  )-----------( 249      ( 16 May 2023 12:04 )             45.6   05-16    141  |  108  |  22  ----------------------------<  109<H>  4.2   |  20<L>  |  0.94    Ca    9.0      16 May 2023 12:04  Mg     2.2     05-16    TPro  7.1  /  Alb  3.7  /  TBili  1.2  /  DBili  x   /  AST  15  /  ALT  15  /  AlkPhos  102  05-16    PT/INR - ( 16 May 2023 12:04 )   PT: 13.5 sec;   INR: 1.13          PTT - ( 16 May 2023 12:04 )  PTT:31.6 sec      LIVER FUNCTIONS - ( 16 May 2023 12:04 )  Alb: 3.7 g/dL / Pro: 7.1 g/dL / ALK PHOS: 102 U/L / ALT: 15 U/L / AST: 15 U/L / GGT: x

## 2023-05-17 ENCOUNTER — TRANSCRIPTION ENCOUNTER (OUTPATIENT)
Age: 68
End: 2023-05-17

## 2023-05-17 VITALS — TEMPERATURE: 97 F

## 2023-05-17 PROBLEM — I10 ESSENTIAL (PRIMARY) HYPERTENSION: Chronic | Status: ACTIVE | Noted: 2023-05-16

## 2023-05-17 PROBLEM — Z86.39 PERSONAL HISTORY OF OTHER ENDOCRINE, NUTRITIONAL AND METABOLIC DISEASE: Chronic | Status: ACTIVE | Noted: 2023-05-16

## 2023-05-17 PROBLEM — I48.91 UNSPECIFIED ATRIAL FIBRILLATION: Chronic | Status: ACTIVE | Noted: 2023-05-16

## 2023-05-17 PROBLEM — R73.03 PREDIABETES: Chronic | Status: ACTIVE | Noted: 2023-05-16

## 2023-05-17 PROBLEM — E78.5 HYPERLIPIDEMIA, UNSPECIFIED: Chronic | Status: ACTIVE | Noted: 2023-05-16

## 2023-05-17 LAB
A1C WITH ESTIMATED AVERAGE GLUCOSE RESULT: 5.4 % — SIGNIFICANT CHANGE UP (ref 4–5.6)
ALBUMIN SERPL ELPH-MCNC: 4 G/DL — SIGNIFICANT CHANGE UP (ref 3.3–5)
ALP SERPL-CCNC: 100 U/L — SIGNIFICANT CHANGE UP (ref 40–120)
ALT FLD-CCNC: 15 U/L — SIGNIFICANT CHANGE UP (ref 10–45)
ANION GAP SERPL CALC-SCNC: 13 MMOL/L — SIGNIFICANT CHANGE UP (ref 5–17)
AST SERPL-CCNC: 18 U/L — SIGNIFICANT CHANGE UP (ref 10–40)
BILIRUB DIRECT SERPL-MCNC: 0.3 MG/DL — SIGNIFICANT CHANGE UP (ref 0–0.3)
BILIRUB INDIRECT FLD-MCNC: 1.4 MG/DL — HIGH (ref 0.2–1)
BILIRUB SERPL-MCNC: 1.7 MG/DL — HIGH (ref 0.2–1.2)
BUN SERPL-MCNC: 20 MG/DL — SIGNIFICANT CHANGE UP (ref 7–23)
CALCIUM SERPL-MCNC: 9.2 MG/DL — SIGNIFICANT CHANGE UP (ref 8.4–10.5)
CHLORIDE SERPL-SCNC: 107 MMOL/L — SIGNIFICANT CHANGE UP (ref 96–108)
CHOLEST SERPL-MCNC: 132 MG/DL — SIGNIFICANT CHANGE UP
CO2 SERPL-SCNC: 23 MMOL/L — SIGNIFICANT CHANGE UP (ref 22–31)
CREAT SERPL-MCNC: 0.85 MG/DL — SIGNIFICANT CHANGE UP (ref 0.5–1.3)
EGFR: 75 ML/MIN/1.73M2 — SIGNIFICANT CHANGE UP
ESTIMATED AVERAGE GLUCOSE: 108 MG/DL — SIGNIFICANT CHANGE UP (ref 68–114)
GLUCOSE SERPL-MCNC: 91 MG/DL — SIGNIFICANT CHANGE UP (ref 70–99)
HCT VFR BLD CALC: 46 % — HIGH (ref 34.5–45)
HCV AB S/CO SERPL IA: 0.2 S/CO — SIGNIFICANT CHANGE UP (ref 0–0.99)
HCV AB SERPL-IMP: SIGNIFICANT CHANGE UP
HDLC SERPL-MCNC: 51 MG/DL — SIGNIFICANT CHANGE UP
HGB BLD-MCNC: 15.1 G/DL — SIGNIFICANT CHANGE UP (ref 11.5–15.5)
LIPID PNL WITH DIRECT LDL SERPL: 69 MG/DL — SIGNIFICANT CHANGE UP
MAGNESIUM SERPL-MCNC: 2.3 MG/DL — SIGNIFICANT CHANGE UP (ref 1.6–2.6)
MCHC RBC-ENTMCNC: 30.5 PG — SIGNIFICANT CHANGE UP (ref 27–34)
MCHC RBC-ENTMCNC: 32.8 GM/DL — SIGNIFICANT CHANGE UP (ref 32–36)
MCV RBC AUTO: 92.9 FL — SIGNIFICANT CHANGE UP (ref 80–100)
NON HDL CHOLESTEROL: 81 MG/DL — SIGNIFICANT CHANGE UP
NRBC # BLD: 0 /100 WBCS — SIGNIFICANT CHANGE UP (ref 0–0)
PLATELET # BLD AUTO: 241 K/UL — SIGNIFICANT CHANGE UP (ref 150–400)
POTASSIUM SERPL-MCNC: 3.8 MMOL/L — SIGNIFICANT CHANGE UP (ref 3.5–5.3)
POTASSIUM SERPL-SCNC: 3.8 MMOL/L — SIGNIFICANT CHANGE UP (ref 3.5–5.3)
PROT SERPL-MCNC: 7.2 G/DL — SIGNIFICANT CHANGE UP (ref 6–8.3)
RBC # BLD: 4.95 M/UL — SIGNIFICANT CHANGE UP (ref 3.8–5.2)
RBC # FLD: 14.6 % — HIGH (ref 10.3–14.5)
SODIUM SERPL-SCNC: 143 MMOL/L — SIGNIFICANT CHANGE UP (ref 135–145)
TRIGL SERPL-MCNC: 61 MG/DL — SIGNIFICANT CHANGE UP
WBC # BLD: 8.8 K/UL — SIGNIFICANT CHANGE UP (ref 3.8–10.5)
WBC # FLD AUTO: 8.8 K/UL — SIGNIFICANT CHANGE UP (ref 3.8–10.5)

## 2023-05-17 PROCEDURE — 85730 THROMBOPLASTIN TIME PARTIAL: CPT

## 2023-05-17 PROCEDURE — 99285 EMERGENCY DEPT VISIT HI MDM: CPT

## 2023-05-17 PROCEDURE — 84484 ASSAY OF TROPONIN QUANT: CPT

## 2023-05-17 PROCEDURE — 99239 HOSP IP/OBS DSCHRG MGMT >30: CPT

## 2023-05-17 PROCEDURE — 80053 COMPREHEN METABOLIC PANEL: CPT

## 2023-05-17 PROCEDURE — 80076 HEPATIC FUNCTION PANEL: CPT

## 2023-05-17 PROCEDURE — 83880 ASSAY OF NATRIURETIC PEPTIDE: CPT

## 2023-05-17 PROCEDURE — 36415 COLL VENOUS BLD VENIPUNCTURE: CPT

## 2023-05-17 PROCEDURE — 93005 ELECTROCARDIOGRAM TRACING: CPT

## 2023-05-17 PROCEDURE — 85027 COMPLETE CBC AUTOMATED: CPT

## 2023-05-17 PROCEDURE — 82553 CREATINE MB FRACTION: CPT

## 2023-05-17 PROCEDURE — 80048 BASIC METABOLIC PNL TOTAL CA: CPT

## 2023-05-17 PROCEDURE — 75572 CT HRT W/3D IMAGE: CPT

## 2023-05-17 PROCEDURE — 80061 LIPID PANEL: CPT

## 2023-05-17 PROCEDURE — 83735 ASSAY OF MAGNESIUM: CPT

## 2023-05-17 PROCEDURE — 85610 PROTHROMBIN TIME: CPT

## 2023-05-17 PROCEDURE — 71045 X-RAY EXAM CHEST 1 VIEW: CPT

## 2023-05-17 PROCEDURE — 82550 ASSAY OF CK (CPK): CPT

## 2023-05-17 PROCEDURE — 85025 COMPLETE CBC W/AUTO DIFF WBC: CPT

## 2023-05-17 PROCEDURE — 97162 PT EVAL MOD COMPLEX 30 MIN: CPT

## 2023-05-17 PROCEDURE — 86803 HEPATITIS C AB TEST: CPT

## 2023-05-17 PROCEDURE — 93010 ELECTROCARDIOGRAM REPORT: CPT

## 2023-05-17 PROCEDURE — 83036 HEMOGLOBIN GLYCOSYLATED A1C: CPT

## 2023-05-17 RX ORDER — POTASSIUM CHLORIDE 20 MEQ
20 PACKET (EA) ORAL ONCE
Refills: 0 | Status: COMPLETED | OUTPATIENT
Start: 2023-05-17 | End: 2023-05-17

## 2023-05-17 RX ORDER — ASPIRIN/CALCIUM CARB/MAGNESIUM 324 MG
1 TABLET ORAL
Refills: 0 | DISCHARGE

## 2023-05-17 RX ORDER — APIXABAN 2.5 MG/1
1 TABLET, FILM COATED ORAL
Qty: 60 | Refills: 0
Start: 2023-05-17 | End: 2023-06-15

## 2023-05-17 RX ORDER — METOPROLOL TARTRATE 50 MG
1 TABLET ORAL
Qty: 30 | Refills: 0
Start: 2023-05-17 | End: 2023-06-15

## 2023-05-17 RX ORDER — APIXABAN 2.5 MG/1
1 TABLET, FILM COATED ORAL
Qty: 60 | Refills: 1
Start: 2023-05-17 | End: 2023-07-15

## 2023-05-17 RX ORDER — ACETAMINOPHEN 500 MG
650 TABLET ORAL EVERY 6 HOURS
Refills: 0 | Status: DISCONTINUED | OUTPATIENT
Start: 2023-05-17 | End: 2023-05-17

## 2023-05-17 RX ORDER — METOPROLOL TARTRATE 50 MG
1 TABLET ORAL
Refills: 0 | DISCHARGE

## 2023-05-17 RX ADMIN — Medication 650 MILLIGRAM(S): at 03:48

## 2023-05-17 RX ADMIN — APIXABAN 5 MILLIGRAM(S): 2.5 TABLET, FILM COATED ORAL at 05:52

## 2023-05-17 RX ADMIN — Medication 650 MILLIGRAM(S): at 04:48

## 2023-05-17 RX ADMIN — Medication 50 MILLIGRAM(S): at 05:53

## 2023-05-17 RX ADMIN — LOSARTAN POTASSIUM 25 MILLIGRAM(S): 100 TABLET, FILM COATED ORAL at 05:52

## 2023-05-17 RX ADMIN — Medication 20 MILLIEQUIVALENT(S): at 10:35

## 2023-05-17 NOTE — DISCHARGE NOTE NURSING/CASE MANAGEMENT/SOCIAL WORK - PATIENT PORTAL LINK FT
You can access the FollowMyHealth Patient Portal offered by [facility] by registering at the following website: [email or web address]. By joining [facility]’s FollowMyHealth portal, you will also be able to view your health information using other applications (apps) compatible with our system.

## 2023-05-17 NOTE — PHYSICAL THERAPY INITIAL EVALUATION ADULT - ADDITIONAL COMMENTS
As per pt, PTA she struggled to independently perform functional mobility, ADLs, and IADLs. Pt lives alone in a studio apartment, sometimes has assistance from friends. Has a shopping cart she uses for community ambulation. Has a straight cane however does not use it. Does not use a walker because "it make my balance worse." Pt states her friend is showing her how to use a tablet to order groceries because she has been unable to get them on her own. As per pt, PTA she struggled to independently perform functional mobility, ADLs, and IADLs. Pt lives alone in a studio apartment, sometimes has assistance from friends. Has a shopping cart she uses for community ambulation. Has a straight cane however does not use it. Does not use a walker because "it make my balance worse." Pt states her friend is showing her how to use a tablet to order groceries because she has been unable to get them on her own. Pt has been getting some HPT but does not feel it is helpful.

## 2023-05-17 NOTE — PHYSICAL THERAPY INITIAL EVALUATION ADULT - GAIT DEVIATIONS NOTED, PT EVAL
decreased kenyetta/decreased velocity of limb motion/decreased step length/decreased weight-shifting ability

## 2023-05-17 NOTE — DISCHARGE NOTE PROVIDER - NSDCFUADDINST_GEN_ALL_CORE_FT
Eliquis can put you at increased risk of bleeding; please avoid NSAIDS (such as Motrin, Advil, Ibuprofen, Naproxen, or Aleve, as these medications may further your risk of bleeding

## 2023-05-17 NOTE — PHYSICAL THERAPY INITIAL EVALUATION ADULT - PERTINENT HX OF CURRENT PROBLEM, REHAB EVAL
66 yo F,  morbidly obese, FMH of early  CAD (Mother MI's in 40's, father  - CABG 60's), PMH  prediabetes, HTN, HL, non obstructive CAD (normal NST 4/2022), depression who was sent to [facility] ED by her cardiologist for evaluation and management of new A fib. Patient presented to her cardiologist for follow up and endorsed SOB, palpitations and chest heaviness, office ekg today showed new Afib RVR, given 40mg IV cardizem by EMS  with VR down to 80's and referred to ED for further management and DCCV. Pt is now s/p DCCV.

## 2023-05-17 NOTE — DISCHARGE NOTE PROVIDER - PROVIDER TOKENS
PROVIDER:[TOKEN:[[id number]],SCHEDULEDAPPT:[07/17/2023],SCHEDULEDAPPTTIME:[01:50 PM]],PROVIDER:[TOKEN:[[id number]],FOLLOWUP:[1 month]]

## 2023-05-17 NOTE — PHYSICAL THERAPY INITIAL EVALUATION ADULT - BED MOBILITY LIMITATIONS, REHAB EVAL
Not assessed as pt was received and left in bedside chair. Pt refusing to return to supine secondary to c/o lightheadedness/dizziness, /69. RN Tammy/NP Alyssa acuna. Not assessed as pt was received in standing and left in bedside chair. Pt declining to return to supine secondary to c/o lightheadedness/dizziness, /69. RN Tammy/NP Alyssa acuna.

## 2023-05-17 NOTE — DISCHARGE NOTE PROVIDER - HOSPITAL COURSE
66 y/o morbidly obeseF, FMHx early CAD (Mother MIs 40s, father CABG 60s), PMHx pre-diabetes, HTN, HLD, non-obstructive CAD (normal NST 4/2022) and depression sent to ED 5/16 by cardiologist for evaluation/mgmt new onset A-fib. En route given IV Cardizem 40mg x 1 by EMS. In ED, HR 80s SBPs stable. Admitted to tele for further mgmt new onset A-fib.    During hospitalization  CT Heart performed s/f no ALISHA or LA thrombus. She underwent DCCV and now remains in SR. Of note prior ECHO 3/17: EF 55%, no WMA and no significant valvular disease.   On the day of discharge, the patient was seen and examined. Symptoms improved. Vital signs are stable. Labs and imaging reviewed. Patient is medically optimized and hemodynamically stable. Return precautions discussed, medication teach back done w/ patient, and importance of physician followup emphasized for which she verbalized understanding. Eliquis Co-pay $342. She will receive one month supply free from [facility] and is being given an additional free one month supply upon discharge for adequate supply till her f/u apt with Dr. Erazo. PT eval- MICHAEL - pt refusing MICHAEL and will be dc home today.        66 y/o morbidly obeseF, FMHx early CAD (Mother MIs 40s, father CABG 60s), PMHx pre-diabetes, HTN, HLD, non-obstructive CAD (normal NST 4/2022) and depression sent to ED 5/16 by cardiologist for evaluation/mgmt new onset A-fib. En route given IV Cardizem 40mg x 1 by EMS. In ED, HR 80s SBPs stable. Admitted to tele for further mgmt new onset A-fib.    During hospitalization  CT Heart performed s/f no ALISHA or LA thrombus. She underwent DCCV and now remains in SR. Of note prior ECHO 3/17: EF 55%, no WMA and no significant valvular disease.   On the day of discharge, the patient was seen and examined. Symptoms improved. Vital signs are stable. Labs and imaging reviewed. Patient is medically optimized and hemodynamically stable. Return precautions discussed, medication teach back done w/ patient, and importance of physician followup emphasized for which she verbalized understanding. Eliquis Co-pay $342. She will receive one month supply free from [facility] and is being given an additional free one month supply upon discharge for adequate supply till her f/u apt with Dr. Erazo. PT eval- MICHAEL - pt refusing MICHAEL and will be dc home today.       DC Meds:  Eliquis 5mg BID   Lipitor 80mg  Losartan 25mg  Metoprolol Succinate 50mg  Norvasc 10mg

## 2023-05-17 NOTE — PHYSICAL THERAPY INITIAL EVALUATION ADULT - GAIT DISTANCE, PT EVAL
in room; Pt refusing to perform further ambulation despite max encouragement / rationale. Pt reports fatigue and shortness of breath after 5' in room./5 feet in room; Pt declining to perform further ambulation despite max encouragement / rationale. Pt reports fatigue, shortness of breath, dizziness, & sciatic pain after 5' in room./5 feet

## 2023-05-17 NOTE — PHYSICAL THERAPY INITIAL EVALUATION ADULT - IMPAIRMENTS CONTRIBUTING TO GAIT DEVIATIONS, PT EVAL
Patient with flu like symptoms that began Sunday. Sibling at home tested flu A+. Mom denies nausea, vomiting, diarrhea.   Normal UOP/PO+   No meds pta.  
impaired balance/impaired postural control/decreased strength

## 2023-05-17 NOTE — DISCHARGE NOTE PROVIDER - CARE PROVIDERS DIRECT ADDRESSES
[email or web address]

## 2023-05-17 NOTE — DISCHARGE NOTE PROVIDER - NSDCCPCAREPLAN_GEN_ALL_CORE_FT
PRINCIPAL DISCHARGE DIAGNOSIS  Diagnosis: Atrial fibrillation  Assessment and Plan of Treatment: - You came into the hospital with complaints of shortness of breath and palpitations. You were found to be in a fast and irregular heart rhythm called atrial fibrillation. In people with A-fib, the electrical signals that control the heartbeat are abnormal. As a result, the top 2 chambers of the heart stop pumping effectively, and a small amount of the blood that should move out of these chambers gets left behind. As the blood pools, it can start to form clots. These clots can travel to the brain through the blood vessels, and cause strokes.   - A CT scan of your heart was performed which showed NO BLOOD CLOTS for which a cardioversion was performed. A Cardioversion terminates arrhythmias by delivering a synchronized shock that depolarizes the tissue involved in a reentrant circuit. You now remain in a NORMAL rhythm.  - Please continue Eliquis 5mg twice a day which is a blood thinner and prevents blood clots from forming. You are getting 2 MONTHS OF ELIQUIS SUPPLIED FOR FREE.   - Please continue Metoprolol tartrate 50mg twice a day to maintain a normal heart rhythm and heart rate.   - Please follow up as scheduled with Dr. Erazo on 7/17 at 1:50PM****    ( You will receive coupons at that time to lower the Co-pay for Eliquis)        PRINCIPAL DISCHARGE DIAGNOSIS  Diagnosis: Atrial fibrillation  Assessment and Plan of Treatment: - You came into the hospital with complaints of shortness of breath and palpitations. You were found to be in a fast and irregular heart rhythm called atrial fibrillation. In people with A-fib, the electrical signals that control the heartbeat are abnormal. As a result, the top 2 chambers of the heart stop pumping effectively, and a small amount of the blood that should move out of these chambers gets left behind. As the blood pools, it can start to form clots. These clots can travel to the brain through the blood vessels, and cause strokes.   - A CT scan of your heart was performed which showed NO BLOOD CLOTS for which a cardioversion was performed. A Cardioversion terminates arrhythmias by delivering a synchronized shock that depolarizes the tissue involved in a reentrant circuit. You now remain in a NORMAL rhythm.  - Please continue Eliquis 5mg twice a day which is a blood thinner and prevents blood clots from forming. You are getting 2 MONTHS OF ELIQUIS SUPPLIED FOR FREE.   - Please continue Metoprolol Succinate 50mg daily to maintain a normal heart rhythm and heart rate.   - Please follow up as scheduled with Dr. Erazo on 7/17 at 1:50PM****    ( You will receive coupons at that time to lower the Co-pay for Eliquis)

## 2023-05-17 NOTE — PHYSICAL THERAPY INITIAL EVALUATION ADULT - LEVEL OF INDEPENDENCE: STAIR NEGOTIATION, REHAB EVAL
Pt refusing to perform despite max encouragement / rationale Pt declining stairs assessment despite max encouragement / rationale

## 2023-05-17 NOTE — DISCHARGE NOTE PROVIDER - CARE PROVIDER_API CALL
Josef Erazo)  Cardiac Electrophysiology; Cardiovascular Disease; Internal Medicine  [address] [address]  Phone: [phone]  Fax: [phone]  Scheduled Appointment: 07/17/2023 01:50 PM    Jaquelin Petersen)  Cardiology  [address], [address]  [place] [address]  Phone: [phone]  Fax: [phone]  Follow Up Time: 1 month

## 2023-05-17 NOTE — DISCHARGE NOTE PROVIDER - NSDCFUSCHEDAPPT_GEN_ALL_CORE_FT
Josef Erazo  [facility] Physician [facility]  [facility] [address]  Scheduled Appointment: 07/17/2023

## 2023-05-17 NOTE — DISCHARGE NOTE PROVIDER - NSDCMRMEDTOKEN_GEN_ALL_CORE_FT
aspirin 81 mg oral tablet: 1 tab(s) orally once a day  Eliquis 5 mg oral tablet: 1 tab(s) orally 2 times a day  Lipitor 80 mg oral tablet: 1 tab(s) orally once a day (at bedtime)  losartan 25 mg oral tablet: 1 tab(s) orally once a day  Norvasc 10 mg oral tablet: 1 tab(s) orally once a day  Toprol-XL 50 mg oral tablet, extended release: 1 tab(s) orally once a day   Eliquis 5 mg oral tablet: 1 tab(s) orally 2 times a day  Lipitor 80 mg oral tablet: 1 tab(s) orally once a day (at bedtime)  losartan 25 mg oral tablet: 1 tab(s) orally once a day  Metoprolol Succinate ER 50 mg oral tablet, extended release: 1 tab(s) orally once a day  Norvasc 10 mg oral tablet: 1 tab(s) orally once a day

## 2023-05-18 ENCOUNTER — RX RENEWAL (OUTPATIENT)
Age: 68
End: 2023-05-18

## 2023-05-19 DIAGNOSIS — Z88.8 ALLERGY STATUS TO OTHER DRUGS, MEDICAMENTS AND BIOLOGICAL SUBSTANCES: ICD-10-CM

## 2023-05-19 DIAGNOSIS — E78.5 HYPERLIPIDEMIA, UNSPECIFIED: ICD-10-CM

## 2023-05-19 DIAGNOSIS — I48.91 UNSPECIFIED ATRIAL FIBRILLATION: ICD-10-CM

## 2023-05-19 DIAGNOSIS — R73.03 PREDIABETES: ICD-10-CM

## 2023-05-19 DIAGNOSIS — Z82.49 FAMILY HISTORY OF ISCHEMIC HEART DISEASE AND OTHER DISEASES OF THE CIRCULATORY SYSTEM: ICD-10-CM

## 2023-05-19 DIAGNOSIS — F32.A DEPRESSION, UNSPECIFIED: ICD-10-CM

## 2023-05-19 DIAGNOSIS — E66.01 MORBID (SEVERE) OBESITY DUE TO EXCESS CALORIES: ICD-10-CM

## 2023-05-19 DIAGNOSIS — Z88.5 ALLERGY STATUS TO NARCOTIC AGENT: ICD-10-CM

## 2023-05-19 DIAGNOSIS — Z87.891 PERSONAL HISTORY OF NICOTINE DEPENDENCE: ICD-10-CM

## 2023-05-19 DIAGNOSIS — Z79.82 LONG TERM (CURRENT) USE OF ASPIRIN: ICD-10-CM

## 2023-05-19 DIAGNOSIS — Z88.2 ALLERGY STATUS TO SULFONAMIDES: ICD-10-CM

## 2023-05-19 DIAGNOSIS — I25.10 ATHEROSCLEROTIC HEART DISEASE OF NATIVE CORONARY ARTERY WITHOUT ANGINA PECTORIS: ICD-10-CM

## 2023-05-19 DIAGNOSIS — I10 ESSENTIAL (PRIMARY) HYPERTENSION: ICD-10-CM

## 2023-05-25 ENCOUNTER — NON-APPOINTMENT (OUTPATIENT)
Age: 68
End: 2023-05-25

## 2023-06-15 ENCOUNTER — APPOINTMENT (OUTPATIENT)
Dept: HEART AND VASCULAR | Facility: CLINIC | Age: 68
End: 2023-06-15

## 2023-07-17 ENCOUNTER — NON-APPOINTMENT (OUTPATIENT)
Age: 68
End: 2023-07-17

## 2023-07-17 ENCOUNTER — APPOINTMENT (OUTPATIENT)
Dept: HEART AND VASCULAR | Facility: CLINIC | Age: 68
End: 2023-07-17

## 2023-07-17 ENCOUNTER — APPOINTMENT (OUTPATIENT)
Dept: HEART AND VASCULAR | Facility: CLINIC | Age: 68
End: 2023-07-17
Payer: MEDICARE

## 2023-07-17 VITALS — HEART RATE: 89 BPM | OXYGEN SATURATION: 94 % | DIASTOLIC BLOOD PRESSURE: 60 MMHG | SYSTOLIC BLOOD PRESSURE: 90 MMHG

## 2023-07-17 VITALS — TEMPERATURE: 97.6 F | HEIGHT: 63.5 IN

## 2023-07-17 PROCEDURE — 93000 ELECTROCARDIOGRAM COMPLETE: CPT

## 2023-07-17 PROCEDURE — 99214 OFFICE O/P EST MOD 30 MIN: CPT | Mod: 25

## 2023-07-17 RX ORDER — MELOXICAM 15 MG/1
15 TABLET ORAL
Qty: 30 | Refills: 1 | Status: DISCONTINUED | COMMUNITY
Start: 2017-08-17 | End: 2023-07-17

## 2023-07-17 RX ORDER — ASPIRIN ENTERIC COATED TABLETS 81 MG 81 MG/1
81 TABLET, DELAYED RELEASE ORAL DAILY
Qty: 90 | Refills: 2 | Status: DISCONTINUED | COMMUNITY
Start: 2017-11-22 | End: 2023-07-17

## 2023-07-17 RX ORDER — HYDROCORTISONE 10 MG/G
1 CREAM TOPICAL
Qty: 1 | Refills: 0 | Status: DISCONTINUED | OUTPATIENT
Start: 2018-11-20 | End: 2023-07-17

## 2023-07-17 RX ORDER — HYALURONATE SODIUM 20 MG/2 ML
20 SYRINGE (ML) INTRAARTICULAR
Qty: 2 | Refills: 0 | Status: DISCONTINUED | OUTPATIENT
Start: 2022-08-26 | End: 2023-07-17

## 2023-07-18 NOTE — DISCUSSION/SUMMARY
[FreeTextEntry1] : Ms. García is a pleasant 68 year-old female with a past medical history significant for Pre-diabetes, HTN, HLD, non-obstructive CAD (normal NST 4/2022), depression and persistent atrial fibrillation s/p DCCV 5/16/23 who presents for follow-up.  She is back in atrial fibrillation on ECG and is symptomatic with RICHARDS and fatigue.  We discussed the natural history of AFib, associated risk for TE/CVA and options for management.  I have recommended AAD therapy (Multaq) followed by repeat DCCV for restoration and maintenance of sinus rhythm.  Risk modification was discussed in detail, including a heart healthy diet, routine exercise and ROHAN evaluation.  Advised to continue OAC for TE/CVA prophylaxis.  She was asked to return for follow-up one month post procedure.

## 2023-07-18 NOTE — ADDENDUM
[FreeTextEntry1] : I, Josef Erazo, hereby attest that the medical record entry for this patient accurately reflects signatures/notations that I made on the Date of Service in my capacity as an Attending Physician when I treated/diagnosed the above patient. I do hereby attest that this information is true, accurate and complete to the best of my knowledge.  I was present for the entire visit and supervised the entire visit and made/agree with the plan as outlined.\par

## 2023-07-18 NOTE — HISTORY OF PRESENT ILLNESS
[FreeTextEntry1] : Ms. García is a pleasant 68 year-old female with a past medical history significant for Pre-diabetes, HTN, HLD, non-obstructive CAD (normal NST 4/2022), depression and persistent atrial fibrillation s/p DCCV 5/16/23 who presents for follow-up.  \par \par She has noted recurrent SOB with minimal exertion since hospital discharge.  She notes some palpitations but not as strong as when she was initially found to be in AFib.  She denies any bleeding issues on OAC. \par \par Reports h/o ROHAN but is not using CPAP.  \par \par ECHO 3/2023: EF 55%, no WMA and no significant valvular disease.

## 2023-07-18 NOTE — PHYSICAL EXAM
[Obese] : obese [Ill-Appearing] : ill-appearing [5th Left ICS - MCL] : palpated at the 5th LICS in the midclavicular line [Irregularly Irregular] : irregularly irregular [Normal] : alert and oriented, normal memory

## 2023-08-09 ENCOUNTER — OUTPATIENT (OUTPATIENT)
Dept: OUTPATIENT SERVICES | Facility: HOSPITAL | Age: 68
LOS: 1 days | Discharge: HOME | End: 2023-08-09
Payer: MEDICARE

## 2023-08-09 LAB
ANION GAP SERPL CALC-SCNC: 10 MMOL/L — SIGNIFICANT CHANGE UP (ref 5–17)
BUN SERPL-MCNC: 18 MG/DL — SIGNIFICANT CHANGE UP (ref 7–23)
CALCIUM SERPL-MCNC: 9.6 MG/DL — SIGNIFICANT CHANGE UP (ref 8.4–10.5)
CHLORIDE SERPL-SCNC: 107 MMOL/L — SIGNIFICANT CHANGE UP (ref 96–108)
CO2 SERPL-SCNC: 23 MMOL/L — SIGNIFICANT CHANGE UP (ref 22–31)
CREAT SERPL-MCNC: 1.22 MG/DL — SIGNIFICANT CHANGE UP (ref 0.5–1.3)
EGFR: 48 ML/MIN/1.73M2 — LOW
GLUCOSE SERPL-MCNC: 105 MG/DL — HIGH (ref 70–99)
ISTAT INR: 1.3 — HIGH (ref 0.88–1.16)
ISTAT PT: 15.9 SEC — HIGH (ref 10–12.9)
ISTAT VENOUS BE: 1 MMOL/L — SIGNIFICANT CHANGE UP (ref -2–3)
ISTAT VENOUS GLUCOSE: 100 MG/DL — HIGH (ref 70–99)
ISTAT VENOUS HCO3: 24 MMOL/L — SIGNIFICANT CHANGE UP (ref 23–28)
ISTAT VENOUS HEMATOCRIT: 43 % — SIGNIFICANT CHANGE UP (ref 34.5–45)
ISTAT VENOUS HEMOGLOBIN: 14.6 GM/DL — SIGNIFICANT CHANGE UP (ref 11.5–15.5)
ISTAT VENOUS IONIZED CALCIUM: 0.92 MMOL/L — LOW (ref 1.12–1.3)
ISTAT VENOUS PCO2: 31 MMHG — LOW (ref 41–51)
ISTAT VENOUS PH: 7.49 — HIGH (ref 7.31–7.41)
ISTAT VENOUS PO2: <66 MMHG — HIGH (ref 35–40)
ISTAT VENOUS POTASSIUM: >9 MMOL/L — CRITICAL HIGH (ref 3.5–5.3)
ISTAT VENOUS SO2: 83 % — SIGNIFICANT CHANGE UP
ISTAT VENOUS SODIUM: 135 MMOL/L — SIGNIFICANT CHANGE UP (ref 135–145)
ISTAT VENOUS TCO2: 24 MMOL/L — SIGNIFICANT CHANGE UP (ref 22–31)
POCT ISTAT CREATININE: 1.1 MG/DL — SIGNIFICANT CHANGE UP (ref 0.5–1.3)
POTASSIUM SERPL-MCNC: 4 MMOL/L — SIGNIFICANT CHANGE UP (ref 3.5–5.3)
POTASSIUM SERPL-SCNC: 4 MMOL/L — SIGNIFICANT CHANGE UP (ref 3.5–5.3)
SODIUM SERPL-SCNC: 140 MMOL/L — SIGNIFICANT CHANGE UP (ref 135–145)

## 2023-08-09 PROCEDURE — 92960 CARDIOVERSION ELECTRIC EXT: CPT

## 2023-08-09 PROCEDURE — 84295 ASSAY OF SERUM SODIUM: CPT

## 2023-08-09 PROCEDURE — 84132 ASSAY OF SERUM POTASSIUM: CPT

## 2023-08-09 PROCEDURE — 82803 BLOOD GASES ANY COMBINATION: CPT

## 2023-08-09 PROCEDURE — 82947 ASSAY GLUCOSE BLOOD QUANT: CPT

## 2023-08-09 PROCEDURE — 82565 ASSAY OF CREATININE: CPT

## 2023-08-09 PROCEDURE — 82330 ASSAY OF CALCIUM: CPT

## 2023-08-09 PROCEDURE — 36415 COLL VENOUS BLD VENIPUNCTURE: CPT

## 2023-08-09 PROCEDURE — 80048 BASIC METABOLIC PNL TOTAL CA: CPT

## 2023-08-09 PROCEDURE — 85014 HEMATOCRIT: CPT

## 2023-08-09 PROCEDURE — 85610 PROTHROMBIN TIME: CPT

## 2023-08-10 ENCOUNTER — RX RENEWAL (OUTPATIENT)
Age: 68
End: 2023-08-10

## 2023-08-10 LAB
ISTAT VENOUS BE: 0 MMOL/L — SIGNIFICANT CHANGE UP (ref -2–3)
ISTAT VENOUS GLUCOSE: 97 MG/DL — SIGNIFICANT CHANGE UP (ref 70–99)
ISTAT VENOUS HCO3: 24 MMOL/L — SIGNIFICANT CHANGE UP (ref 23–28)
ISTAT VENOUS HEMATOCRIT: 45 % — SIGNIFICANT CHANGE UP (ref 34.5–45)
ISTAT VENOUS HEMOGLOBIN: 15.3 GM/DL — SIGNIFICANT CHANGE UP (ref 11.5–15.5)
ISTAT VENOUS IONIZED CALCIUM: 1.14 MMOL/L — SIGNIFICANT CHANGE UP (ref 1.12–1.3)
ISTAT VENOUS PCO2: 36 MMHG — LOW (ref 41–51)
ISTAT VENOUS PH: 7.43 — HIGH (ref 7.31–7.41)
ISTAT VENOUS PO2: <66 MMHG — LOW (ref 35–40)
ISTAT VENOUS POTASSIUM: 4 MMOL/L — SIGNIFICANT CHANGE UP (ref 3.5–5.3)
ISTAT VENOUS SO2: 65 % — SIGNIFICANT CHANGE UP
ISTAT VENOUS SODIUM: 141 MMOL/L — SIGNIFICANT CHANGE UP (ref 135–145)
ISTAT VENOUS TCO2: 25 MMOL/L — SIGNIFICANT CHANGE UP (ref 22–31)

## 2023-09-11 ENCOUNTER — APPOINTMENT (OUTPATIENT)
Dept: HEART AND VASCULAR | Facility: CLINIC | Age: 68
End: 2023-09-11

## 2023-09-11 ENCOUNTER — RX RENEWAL (OUTPATIENT)
Age: 68
End: 2023-09-11

## 2023-10-10 ENCOUNTER — RX RENEWAL (OUTPATIENT)
Age: 68
End: 2023-10-10

## 2023-11-21 NOTE — PRE-ANESTHESIA EVALUATION ADULT - NSANTHPEFT_GEN_ALL_CORE
General: Appearance is consistent with chronological age. No abnormal facies.  Constitutional: Alert and in no acute distress.  Eyes: The sclera and conjunctiva were normal, pupils were equal in size, round, and reactive to light and extraocular movements were intact.   ENT: The ears and nose were normal in appearance; oropharynx clear, moist mucus membranes.  Neck: The appearance of the neck was normal, no neck mass was observed. There was no jugular-venous distention.   Airway:  See Mallampati score.  Cardiovascular:  Regular rate and rhythm.  Respiratory: Unlabored breathing.  Neurological: No focal deficit, moves all extremities.  Psychiatric: Oriented to person, place, and time, insight and judgment were intact and the affect was normal.  Exercise Tolerance: MET>4. Soolantra Counseling: I discussed with the patients the risks of topial Soolantra. This is a medicine which decreases the number of mites and inflammation in the skin. You experience burning, stinging, eye irritation or allergic reactions.  Please call our office if you develop any problems from using this medication.

## 2023-12-11 ENCOUNTER — RX RENEWAL (OUTPATIENT)
Age: 68
End: 2023-12-11

## 2023-12-11 RX ORDER — LOSARTAN POTASSIUM 25 MG/1
25 TABLET, FILM COATED ORAL DAILY
Qty: 90 | Refills: 1 | Status: ACTIVE | COMMUNITY
Start: 2021-09-20 | End: 1900-01-01

## 2023-12-14 ENCOUNTER — RX RENEWAL (OUTPATIENT)
Age: 68
End: 2023-12-14

## 2024-01-03 ENCOUNTER — APPOINTMENT (OUTPATIENT)
Dept: PULMONOLOGY | Facility: CLINIC | Age: 69
End: 2024-01-03

## 2024-01-10 ENCOUNTER — RX RENEWAL (OUTPATIENT)
Age: 69
End: 2024-01-10

## 2024-01-10 RX ORDER — APIXABAN 5 MG/1
5 TABLET, FILM COATED ORAL
Qty: 60 | Refills: 4 | Status: ACTIVE | COMMUNITY
Start: 2023-07-12 | End: 1900-01-01

## 2024-02-05 ENCOUNTER — RX RENEWAL (OUTPATIENT)
Age: 69
End: 2024-02-05

## 2024-03-11 ENCOUNTER — NON-APPOINTMENT (OUTPATIENT)
Age: 69
End: 2024-03-11

## 2024-03-11 ENCOUNTER — INPATIENT (INPATIENT)
Facility: HOSPITAL | Age: 69
LOS: 0 days | Discharge: HOME HEALTH | DRG: 641 | End: 2024-03-12
Attending: HOSPITALIST | Admitting: HOSPITALIST
Payer: MEDICARE

## 2024-03-11 ENCOUNTER — APPOINTMENT (OUTPATIENT)
Dept: HEART AND VASCULAR | Facility: CLINIC | Age: 69
End: 2024-03-11
Payer: MEDICARE

## 2024-03-11 VITALS
OXYGEN SATURATION: 96 % | HEART RATE: 76 BPM | HEIGHT: 63.5 IN | TEMPERATURE: 97.2 F | DIASTOLIC BLOOD PRESSURE: 57 MMHG | SYSTOLIC BLOOD PRESSURE: 86 MMHG

## 2024-03-11 VITALS
HEIGHT: 63 IN | TEMPERATURE: 97 F | SYSTOLIC BLOOD PRESSURE: 136 MMHG | RESPIRATION RATE: 18 BRPM | DIASTOLIC BLOOD PRESSURE: 81 MMHG | WEIGHT: 293 LBS | OXYGEN SATURATION: 95 % | HEART RATE: 84 BPM

## 2024-03-11 DIAGNOSIS — I12.9 HYPERTENSIVE CHRONIC KIDNEY DISEASE WITH STAGE 1 THROUGH STAGE 4 CHRONIC KIDNEY DISEASE, OR UNSPECIFIED CHRONIC KIDNEY DISEASE: ICD-10-CM

## 2024-03-11 DIAGNOSIS — R73.03 PREDIABETES: ICD-10-CM

## 2024-03-11 DIAGNOSIS — I10 ESSENTIAL (PRIMARY) HYPERTENSION: ICD-10-CM

## 2024-03-11 DIAGNOSIS — E78.5 HYPERLIPIDEMIA, UNSPECIFIED: ICD-10-CM

## 2024-03-11 DIAGNOSIS — E86.0 DEHYDRATION: ICD-10-CM

## 2024-03-11 DIAGNOSIS — I48.20 CHRONIC ATRIAL FIBRILLATION, UNSPECIFIED: ICD-10-CM

## 2024-03-11 DIAGNOSIS — E66.01 MORBID (SEVERE) OBESITY DUE TO EXCESS CALORIES: ICD-10-CM

## 2024-03-11 DIAGNOSIS — F32.A DEPRESSION, UNSPECIFIED: ICD-10-CM

## 2024-03-11 DIAGNOSIS — Z88.8 ALLERGY STATUS TO OTHER DRUGS, MEDICAMENTS AND BIOLOGICAL SUBSTANCES: ICD-10-CM

## 2024-03-11 DIAGNOSIS — N17.9 ACUTE KIDNEY FAILURE, UNSPECIFIED: ICD-10-CM

## 2024-03-11 DIAGNOSIS — J81.1 CHRONIC PULMONARY EDEMA: ICD-10-CM

## 2024-03-11 DIAGNOSIS — Z82.49 FAMILY HISTORY OF ISCHEMIC HEART DISEASE AND OTHER DISEASES OF THE CIRCULATORY SYSTEM: ICD-10-CM

## 2024-03-11 DIAGNOSIS — Z79.01 LONG TERM (CURRENT) USE OF ANTICOAGULANTS: ICD-10-CM

## 2024-03-11 DIAGNOSIS — Z88.2 ALLERGY STATUS TO SULFONAMIDES: ICD-10-CM

## 2024-03-11 DIAGNOSIS — Z88.5 ALLERGY STATUS TO NARCOTIC AGENT: ICD-10-CM

## 2024-03-11 DIAGNOSIS — I51.7 CARDIOMEGALY: ICD-10-CM

## 2024-03-11 DIAGNOSIS — N18.32 CHRONIC KIDNEY DISEASE, STAGE 3B: ICD-10-CM

## 2024-03-11 LAB
ANION GAP SERPL CALC-SCNC: 12 MMOL/L — SIGNIFICANT CHANGE UP (ref 5–17)
APTT BLD: 32.6 SEC — SIGNIFICANT CHANGE UP (ref 24.5–35.6)
BASOPHILS # BLD AUTO: 0.05 K/UL — SIGNIFICANT CHANGE UP (ref 0–0.2)
BASOPHILS NFR BLD AUTO: 0.7 % — SIGNIFICANT CHANGE UP (ref 0–2)
BUN SERPL-MCNC: 25 MG/DL — HIGH (ref 7–23)
CALCIUM SERPL-MCNC: 9.8 MG/DL — SIGNIFICANT CHANGE UP (ref 8.4–10.5)
CHLORIDE SERPL-SCNC: 108 MMOL/L — SIGNIFICANT CHANGE UP (ref 96–108)
CK MB CFR SERPL CALC: 1.1 NG/ML — SIGNIFICANT CHANGE UP (ref 0–6.7)
CK MB CFR SERPL CALC: 1.2 NG/ML — SIGNIFICANT CHANGE UP (ref 0–6.7)
CK SERPL-CCNC: 70 U/L — SIGNIFICANT CHANGE UP (ref 25–170)
CK SERPL-CCNC: 98 U/L — SIGNIFICANT CHANGE UP (ref 25–170)
CO2 SERPL-SCNC: 22 MMOL/L — SIGNIFICANT CHANGE UP (ref 22–31)
CREAT SERPL-MCNC: 1.49 MG/DL — HIGH (ref 0.5–1.3)
EGFR: 38 ML/MIN/1.73M2 — LOW
EOSINOPHIL # BLD AUTO: 0.08 K/UL — SIGNIFICANT CHANGE UP (ref 0–0.5)
EOSINOPHIL NFR BLD AUTO: 1.1 % — SIGNIFICANT CHANGE UP (ref 0–6)
GLUCOSE SERPL-MCNC: 104 MG/DL — HIGH (ref 70–99)
HCT VFR BLD CALC: 46.1 % — HIGH (ref 34.5–45)
HGB BLD-MCNC: 15.1 G/DL — SIGNIFICANT CHANGE UP (ref 11.5–15.5)
IMM GRANULOCYTES NFR BLD AUTO: 0.1 % — SIGNIFICANT CHANGE UP (ref 0–0.9)
INR BLD: 1.29 — HIGH (ref 0.85–1.18)
LYMPHOCYTES # BLD AUTO: 1.27 K/UL — SIGNIFICANT CHANGE UP (ref 1–3.3)
LYMPHOCYTES # BLD AUTO: 17.3 % — SIGNIFICANT CHANGE UP (ref 13–44)
MAGNESIUM SERPL-MCNC: 2.3 MG/DL — SIGNIFICANT CHANGE UP (ref 1.6–2.6)
MCHC RBC-ENTMCNC: 30.4 PG — SIGNIFICANT CHANGE UP (ref 27–34)
MCHC RBC-ENTMCNC: 32.8 GM/DL — SIGNIFICANT CHANGE UP (ref 32–36)
MCV RBC AUTO: 92.9 FL — SIGNIFICANT CHANGE UP (ref 80–100)
MONOCYTES # BLD AUTO: 0.62 K/UL — SIGNIFICANT CHANGE UP (ref 0–0.9)
MONOCYTES NFR BLD AUTO: 8.5 % — SIGNIFICANT CHANGE UP (ref 2–14)
NEUTROPHILS # BLD AUTO: 5.29 K/UL — SIGNIFICANT CHANGE UP (ref 1.8–7.4)
NEUTROPHILS NFR BLD AUTO: 72.3 % — SIGNIFICANT CHANGE UP (ref 43–77)
NRBC # BLD: 0 /100 WBCS — SIGNIFICANT CHANGE UP (ref 0–0)
NT-PROBNP SERPL-SCNC: 2199 PG/ML — HIGH (ref 0–300)
PLATELET # BLD AUTO: 228 K/UL — SIGNIFICANT CHANGE UP (ref 150–400)
POTASSIUM SERPL-MCNC: 5.1 MMOL/L — SIGNIFICANT CHANGE UP (ref 3.5–5.3)
POTASSIUM SERPL-SCNC: 5.1 MMOL/L — SIGNIFICANT CHANGE UP (ref 3.5–5.3)
PROTHROM AB SERPL-ACNC: 14.6 SEC — HIGH (ref 9.5–13)
RBC # BLD: 4.96 M/UL — SIGNIFICANT CHANGE UP (ref 3.8–5.2)
RBC # FLD: 13.3 % — SIGNIFICANT CHANGE UP (ref 10.3–14.5)
SODIUM SERPL-SCNC: 142 MMOL/L — SIGNIFICANT CHANGE UP (ref 135–145)
TROPONIN T, HIGH SENSITIVITY RESULT: 8 NG/L — SIGNIFICANT CHANGE UP (ref 0–51)
TROPONIN T, HIGH SENSITIVITY RESULT: <6 NG/L — SIGNIFICANT CHANGE UP (ref 0–51)
WBC # BLD: 7.32 K/UL — SIGNIFICANT CHANGE UP (ref 3.8–10.5)
WBC # FLD AUTO: 7.32 K/UL — SIGNIFICANT CHANGE UP (ref 3.8–10.5)

## 2024-03-11 PROCEDURE — 71045 X-RAY EXAM CHEST 1 VIEW: CPT | Mod: 26

## 2024-03-11 PROCEDURE — 99214 OFFICE O/P EST MOD 30 MIN: CPT

## 2024-03-11 PROCEDURE — 99223 1ST HOSP IP/OBS HIGH 75: CPT

## 2024-03-11 PROCEDURE — 93000 ELECTROCARDIOGRAM COMPLETE: CPT

## 2024-03-11 PROCEDURE — 99285 EMERGENCY DEPT VISIT HI MDM: CPT

## 2024-03-11 PROCEDURE — G2211 COMPLEX E/M VISIT ADD ON: CPT

## 2024-03-11 RX ORDER — DRONEDARONE 400 MG/1
400 TABLET, FILM COATED ORAL EVERY 12 HOURS
Refills: 0 | Status: DISCONTINUED | OUTPATIENT
Start: 2024-03-11 | End: 2024-03-12

## 2024-03-11 RX ORDER — SODIUM CHLORIDE 9 MG/ML
1000 INJECTION INTRAMUSCULAR; INTRAVENOUS; SUBCUTANEOUS
Refills: 0 | Status: DISCONTINUED | OUTPATIENT
Start: 2024-03-11 | End: 2024-03-12

## 2024-03-11 RX ORDER — METOPROLOL TARTRATE 50 MG
50 TABLET ORAL DAILY
Refills: 0 | Status: DISCONTINUED | OUTPATIENT
Start: 2024-03-11 | End: 2024-03-12

## 2024-03-11 RX ORDER — APIXABAN 2.5 MG/1
5 TABLET, FILM COATED ORAL EVERY 12 HOURS
Refills: 0 | Status: DISCONTINUED | OUTPATIENT
Start: 2024-03-11 | End: 2024-03-12

## 2024-03-11 RX ORDER — ATORVASTATIN CALCIUM 80 MG/1
1 TABLET, FILM COATED ORAL
Refills: 0 | DISCHARGE

## 2024-03-11 RX ORDER — INFLUENZA VIRUS VACCINE 15; 15; 15; 15 UG/.5ML; UG/.5ML; UG/.5ML; UG/.5ML
0.7 SUSPENSION INTRAMUSCULAR ONCE
Refills: 0 | Status: DISCONTINUED | OUTPATIENT
Start: 2024-03-11 | End: 2024-03-12

## 2024-03-11 RX ORDER — AMLODIPINE BESYLATE 2.5 MG/1
1 TABLET ORAL
Refills: 0 | DISCHARGE

## 2024-03-11 RX ORDER — ATORVASTATIN CALCIUM 80 MG/1
80 TABLET, FILM COATED ORAL AT BEDTIME
Refills: 0 | Status: DISCONTINUED | OUTPATIENT
Start: 2024-03-11 | End: 2024-03-12

## 2024-03-11 RX ADMIN — ATORVASTATIN CALCIUM 80 MILLIGRAM(S): 80 TABLET, FILM COATED ORAL at 21:35

## 2024-03-11 RX ADMIN — APIXABAN 5 MILLIGRAM(S): 2.5 TABLET, FILM COATED ORAL at 21:34

## 2024-03-11 RX ADMIN — Medication 50 MILLIGRAM(S): at 21:35

## 2024-03-11 NOTE — PHYSICAL EXAM
[Well Developed] : well developed [Well Nourished] : well nourished [No Acute Distress] : no acute distress [No Murmur] : no murmur [Normal S1, S2] : normal S1, S2 [No Rub] : no rub [No Gallop] : no gallop [Normal] : clear lung fields, good air entry, no respiratory distress [Good Air Entry] : good air entry [Clear Lung Fields] : clear lung fields [de-identified] : tachypneic

## 2024-03-11 NOTE — H&P ADULT - NSHPLABSRESULTS_GEN_ALL_CORE
LABS:                          15.1   7.32  )-----------( 228      ( 11 Mar 2024 17:20 )             46.1     03-11    142  |  108  |  25<H>  ----------------------------<  104<H>  5.1   |  22  |  1.49<H>    Ca    9.8      11 Mar 2024 17:20  Mg     2.3     03-11        PT/INR - ( 11 Mar 2024 17:20 )   PT: 14.6 sec;   INR: 1.29          PTT - ( 11 Mar 2024 17:20 )  PTT:32.6 sec  Urinalysis Basic - ( 11 Mar 2024 17:20 )    Color: x / Appearance: x / SG: x / pH: x  Gluc: 104 mg/dL / Ketone: x  / Bili: x / Urobili: x   Blood: x / Protein: x / Nitrite: x   Leuk Esterase: x / RBC: x / WBC x   Sq Epi: x / Non Sq Epi: x / Bacteria: x

## 2024-03-11 NOTE — H&P ADULT - PROBLEM SELECTOR PLAN 3
SBP 130s  - Continue: Amlodipine 10 mg QD, Toprol XL 50 mg BID   - HOLD: Losartan 25 mg QD i/s/o JONATHAN SBP 130s  - Continue: Amlodipine 10 mg QD, Toprol XL 50 mg QD   - HOLD: Losartan 25 mg QD i/s/o JONATHAN SBP 130s  - Continue: Toprol XL 50 mg QD   - HOLD: Losartan 25 mg QD i/s/o JONATHAN, Amlodipine 10 mg QD until EF assessed

## 2024-03-11 NOTE — HISTORY OF PRESENT ILLNESS
[FreeTextEntry1] : 68 year old female with h/o obesity, prediabetes, htn, hl, cad, depression, afib s/p DCCV x 2 in  who presents for f/up today.      last visit , sent to [facility] w new afib w rvr  CT Heart : IMPRESSION: Cardiac: 1.  No left atrial appendage or left atrial thrombus noted. 2.  Coronary artery calcification noted (this study was not optimized for coronary evaluation) 3.  Dilated left atrium. Non-cardiac: 1. Since 2017, there is a new 6 mm nodule in the left upper lobe. Recommend follow-up chest CT scan in six months to assess for resolution/stability. 2. Dilated main pulmonary artery, likely representing pulmonary hypertension. 3. Mosaic attenuation pattern, with patchy and nodular groundglass opacities bilaterally, likely representing pulmonary edema.  She underwent DCCV  started on eliquis   seen by EP in followup and noted to be in afib again :  started on multaq and had repeat DCCV      notes worsening sob/serna and has edema notes  She denies cp, orthopnea, pnd, palpitations. She denies syncope    -Nuclear stress : normal   Has had extensive w/up for presyncope.  Seen by neuro and had  REEG/AMB EEG- normal. Carotid duplex showed no hemodynamically significant stenosis. MRI brain was unremarkable.    Seen by EP who recommended leslee eval which she declined. Also placed holter/event monitor  Holter/event:  avg HR 59, fluttering corresponded to pac's and short atrial runs    CTA cor's : borderline ectasia of aortic root/asc aorta 3.9 cm, calcium score 614, 98%, LM normal, prox/mid LAD mild stenosis, distal LAD minimal stenosis, D1 normal, D2 minimal stenosis, LCx prox/mid mild stenosis, OM1 small patent, OM2 normal, RCA prox mild stenosis, mid/distal RCA minimal stenosis, RPDA/RPL mild stenosis, ef 50%      Seen by Endo for thyroid nodule and hypoglycemia eval.    Echo 3/17 with normal ef 55%, mild conc lvh, no significant valvular disease.    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

## 2024-03-11 NOTE — H&P ADULT - HISTORY OF PRESENT ILLNESS
68 y/o F, FHx early CAD (mother MIs 40s, father CABG 60s), PMHx  68 y/o F, FHx early CAD (mother MIs 40s, father CABG 60s), PMHx HTN, HLD, non-obstructive CAD, depression, ?CKD Stage III-B (unknown baseline Cr____), history of hyaline membrane disease, sent to ED by EMS from Dr. Petersen's office (cardiologist) who reportedly found patient to be in Afib w/ RVR. Patient arrived to ED rate-controlled to 70s-80s. Patient reports dyspnea on exertion of 1/2 block for the last 11 weeks, stating it has caused her to be mostly homebound. She has friends check in on her periodically. She reported to Dr. Petersen's office today (cardiologist) who found her HR to be elevated and was sent to ED for further evaluation.     ED Course:   - Labs: WBC 7.32, H/H 15.1/46.1, , INR 1.29, Na 142, K 5.1, BUN/Cr 25/1.49, Glu 104, GFR 38, proBNP 2199, Trop 8, CK 70, CKMB 1.1, Mg 2.3   - Diagnostics:       o EKG (3/11/24): Afib @ 90 bpm, QTc 450 ms      o CXR (3/11/24, prelim): no acute disease; + cardiomegaly   - Interventions: N/A  68 y/o F, FHx early CAD (mother MIs 40s, father CABG 60s), PMHx HTN, HLD, Afib s/p DCCV x2 (2023), depression, ?CKD Stage III-B (unknown baseline Cr), childhood hx hyaline membrane disease, sent to ED by EMS from Dr. Petersen's office (cardiologist) who reportedly found patient to be in Afib w/ RVR. Patient reports dyspnea on exertion of 1/2 block for the last 11 weeks, stating it has caused her to be mostly homebound. She has friends check in on her periodically. She reported to Dr. Petersen's office today (cardiologist) who found her HR to be elevated and was sent to ED for further evaluation. Patient denies CP, current SOB, dizziness, palpitations, orthopnea/PND, leg swelling, LOC, bleeding, melena/hematochezia, fever, chills, URI symptoms, recent illness, recent prolonged travel, hx blood clots or clotting disorders.     ED Course:   - Labs: WBC 7.32, H/H 15.1/46.1, , INR 1.29, Na 142, K 5.1, BUN/Cr 25/1.49, Glu 104, GFR 38, proBNP 2199, Trop 8, CK 70, CKMB 1.1, Mg 2.3   - Diagnostics:       o EKG (3/11/24): Afib @ 90 bpm, QTc 450 ms      o CXR (3/11/24, prelim): no acute disease; + cardiomegaly   - Interventions: N/A     Prior studies:   - CTLA (5/16/23): No ALISHA or LA thrombus, dilated LA, coronary artery calcification   - NST (4/2022): normal   - ECHO (3/2017): nl EF 55%, no WMA, no significant valvular disease    66 y/o F, FHx early CAD (mother MIs 40s, father CABG 60s), PMHx HTN, HLD, Afib on Eliquis (s/p DCCV x2 in 2023), depression, ?CKD Stage III-B (unknown baseline Cr), childhood hx hyaline membrane disease, sent to ED by EMS from Dr. Petersen's office (cardiologist) who reportedly found patient to be in Afib w/ RVR. Patient reports dyspnea on exertion of 1/2 block for the last 11 weeks, stating it has caused her to be mostly homebound. She has friends check in on her periodically. She reported to Dr. Petersen's office today (cardiologist) who found her HR to be elevated and was sent to ED for further evaluation. Patient denies CP, current SOB, dizziness, palpitations, orthopnea/PND, leg swelling, LOC, bleeding, melena/hematochezia, fever, chills, URI symptoms, recent illness, recent prolonged travel, hx blood clots or clotting disorders.     ED Course:   - Labs: WBC 7.32, H/H 15.1/46.1, , INR 1.29, Na 142, K 5.1, BUN/Cr 25/1.49, Glu 104, GFR 38, proBNP 2199, Trop 8, CK 70, CKMB 1.1, Mg 2.3   - Diagnostics:       o EKG (3/11/24): Afib @ 90 bpm, QTc 450 ms      o CXR (3/11/24, prelim): no acute disease; + cardiomegaly   - Interventions: N/A     Prior studies:   - CTLA (5/16/23): No ALISHA or LA thrombus, dilated LA, coronary artery calcification   - NST (4/2022): normal   - ECHO (3/2017): nl EF 55%, no WMA, no significant valvular disease

## 2024-03-11 NOTE — PATIENT PROFILE ADULT - FALL HARM RISK - HARM RISK INTERVENTIONS

## 2024-03-11 NOTE — ED ADULT NURSE NOTE - OBJECTIVE STATEMENT
Pt is a 67y/o M presenting to the ED w/ PREARRIVAL NOTE: DR MORALES , R/O poss HF, PATIENT NEEDS EP CONSULT. Pt w/ c/o of chronic SOB ("worse today"), nausea, chest "heaviness" that started 1hr ago, "I was having afib w/ RVR." Pt denies CP, palpitations, vomiting/diarrhea, fever/chills, numbness/tingling, HA, blurry vision, recent falls @ home. Pt w/ PMHx HTN, obesity, afib (+Eliquis, 2x cardioversion), lyme disease, CAD, HLD, prediabetes. Pt A/Ox3, speaking in clear/complete sentences. Respirations easy/even and unlabored on RA, bilat breath sounds clear to auscultation. No edema present. Pt ambulates independently, uses "shopping cart" outside of home. Pt placed in gown, on continuous cardiac monitor and pulse ox. EKG completed.

## 2024-03-11 NOTE — H&P ADULT - PROBLEM SELECTOR PLAN 1
Pt with hx Afib, reported Afib w/ RVR in office 3/11/24 however rate-controlled 70s-80s on arrival   - s/p successful DCCV x2 (5/2023 as inpatient; 8/2023 as outpatient) at [facility]  - AC: Eliquis 5 mg BID (reports strict adherence)   - Rate-control: Toprol XL 50 mg BID  - Telemetry monitoring, continuous pulse oximetry Pt with hx Afib, reported Afib w/ RVR in office 3/11/24 however rate-controlled 70s-80s on arrival   - s/p successful DCCV x2 (5/2023 as inpatient; 8/2023 as outpatient) at [facility]  - AC: Eliquis 5 mg BID (reports strict adherence)   - Rate-control: Toprol XL 50 mg QD  - Rhythm control: Multaq 400 mg BID  - Telemetry monitoring, continuous pulse oximetry

## 2024-03-11 NOTE — ED PROVIDER NOTE - CLINICAL SUMMARY MEDICAL DECISION MAKING FREE TEXT BOX
68 F w afib, on multaq, metoprolol- sent in for elev hr- chronic serna- cxr/ekg neg  d/w cards - 68 F w afib, on multaq, metoprolol- sent in for elev hr- chronic serna- cxr/ekg neg  d/w cards - admit for cardioversion in the am- d/w Dr Erazo - he will cardiovert

## 2024-03-11 NOTE — DISCUSSION/SUMMARY
[Patient] : the patient [EKG obtained to assist in diagnosis and management of assessed problem(s)] : EKG obtained to assist in diagnosis and management of assessed problem(s) [___ Month(s)] : in [unfilled] month(s) [FreeTextEntry1] : 68 year old female with h/o morbid obesity, htn, hl, cad, + family h/o cad, prediabetes, depression, afib s/p DCCV x 2 - 2023 presents for f/up today for sob   -will refer to ER for afib/rvr, tachypnea, hypotension - needs labs, echo, EP eval and possible cor eval as well -ekg 5/23 - afib w rvr -CT Heart 5/23: IMPRESSION: Cardiac: 1.  No left atrial appendage or left atrial thrombus noted. 2.  Coronary artery calcification noted (this study was not optimized for coronary evaluation) 3.  Dilated left atrium. Non-cardiac: 1. Since 5/24/2017, there is a new 6 mm nodule in the left upper lobe. Recommend follow-up chest CT scan in six months to assess for resolution/stability. 2. Dilated main pulmonary artery, likely representing pulmonary hypertension. 3. Mosaic attenuation pattern, with patchy and nodular groundglass opacities bilaterally, likely representing pulmonary edema.  -EP f/up - s/p DCCV x 2 in 2023 - on eliquis, BB, multaq -echo ordered today  -endocrine f/up -continue losartan, toprol, norvasc -CTA cor's 5/17 mild nonobstructive 3vd -continue lipitor 80 qhs  -Nuclear stress 4/22: normal (calcium score 2017 over 600) -weight loss, diet, exercise (not interested in bariatric surgery) -Echo 3/17 showed normal ef 55%, no wma, no significant valvular disease -neuro w/up for presyncope unrevealing with normal eeg, mri, carotid -blood sugar monitoring -ekg ordered today - afib w rvr  -labs 2023 reviewed, labs ordered today  -f/up 2 month for cad, afib    I have spent 30 minutes reviewing labs, records, tests and discussing bp control, cvd risk factor, cad/lipid, afib management

## 2024-03-11 NOTE — H&P ADULT - PROBLEM SELECTOR PLAN 2
Baseline Cr appears 0.8-1.0, currently 1.49 3/11   - Ordered IVF hydration ____________________  - HOLD: Losartan 25 mg QD (home med)    - Avoid nephrotoxins, renally dose meds Baseline Cr appears 0.8-1.0, currently 1.49 3/11   - Ordered IVF hydration @75 cc/hr x 5 hours  - HOLD: Losartan 25 mg QD (home med) i/s/o JONATHAN  - Avoid nephrotoxins, renally dose meds

## 2024-03-11 NOTE — ED ADULT NURSE NOTE - NSFALLHARMRISKINTERV_ED_ALL_ED
Physical Therapy     Referred by: Shantel Olea DO; Medical Diagnosis (from order):    Diagnosis Information      Diagnosis    332.0 (ICD-9-CM) - G20 (ICD-10-CM) - Parkinson disease (CMS/[facility])    781.0 (ICD-9-CM) - G24.9 (ICD-10-CM) - Dystonia of foot                Progress Note    Visit:  15     SUBJECTIVE                                                                                                             Patient reports that his left foot has a mind of its own today.  It is difficult for him to control its movement and it is more turned in.   He will have a follow up with Thomas in a couple of months.    Functional Change: No falls since last therapy session.    Current functional limitations: Walking, transfers    Pain / Symptoms:  Pain rating (out of 10): Current: 0     OBJECTIVE                                                                                                                      Strength  (out of 5 unless noted, standard test position unless noted, lbs tested with hand held dynamometer)   Hip:    - Flexion:        • Left: 4-        • Right: 5  Knee:    - Flexion:        • Left: 4+        • Right: 5    - Extension:        • Left: 4        • Right: 5  Ankle:    - Dorsiflexion:        • Right: 5    - Plantar Flexion:        • Right: 5    - Inversion:        • Right: 5    - Eversion:        • Right: 5          Balance Tests:   5 Times Sit to Stand: 19 sec    Greater than 16 seconds indicates fall risk in people with Parkinson's  Fontaine    Interpretation: Greater than or = 45 indicates safe, independent ambulation; 44-37 indicates risk    for falls; 36 or lower relates to 100% risk of falling in community living older adults    Minimal Detectable Change: 7 points  Dynamic Gait Index: 11    Interpretation: < or = 19/24 = predictive of falls in the elderly, >22/24= safe ambulatory     Minimal Detectable Change: 2.9 points; Minimally Clinically Important Difference: 1.9 points  Comments /  Details:   5 time sit to stand was completed with upper extremities with weight bearing only through left heel.  When attempting sit to stand without hands. retropulsive falling back into chair.       TREATMENT                                                                                                                  Therapeutic Exercise:  Nustep x 6 minutes workload 4 legs only       Reviewed the importance of aerobic activity.  Recommended a goal of 30 minutes a day.  Starting at a few minutes and building up to 30 minutes.            Neuromuscular Re-Education:  Includes reassessment time.    Alternating toe taps on 2 inch step working from two hands on bars to one hand on bars x 20, one hand on // bars x 10   Alternating toe taps to 2 inch step working from one hand x 20, due to unsteadiness did not progress to no hands              Skilled input: verbal instruction/cues and as detailed above    Writer verbally educated and received verbal consent for hand placement, positioning of patient, and techniques to be performed today from patient for therapist position for techniques as described above and how they are pertinent to the patient's plan of care.    Home Exercise Program:   Evaluation: seated heel raises, toe raises, marching  3/15/21: forward, lateral, and posterior steps focusing on increased foot clearance    5/3/21: encouragement for use of bike      ASSESSMENT                                                                                                             Patient made slight improvements on all of his testing including 5 time sit to stand, Fontaine, and DGI.  5 time sit to stand was completed without upper extremities at evaluation but had to be completed with upper extremities today.  Fontaine and DGI were completed without AFO at evaluation and with AFO today.  Continues to be at an elevated risk for falls with today's testing.  Would recommend use of assistive device at all times.  We  discussed the importance of aerobic activity.  Also discussed that he is feeling his foot is more turned in.  Stressed the importance of closely monitoring his skin daily for any signs of skin break down.  He may require a custom AFO in the future if he develops areas of pressure due to the positioning of his foot.   Pain/symptoms after session (out of 10): 0    To date the patient has made gains limited as reported. Patient continues to have impairments and functional deficits as noted.  Patient will continue to benefit from skilled care as outlined.  Patient Education:   Results of above outlined education: Verbalizes understanding      PLAN                                                                                                                           Updates to plan of care: modify plan of care    Frequency / Duration: 1 times per week tapering as patient progresses  for an estimated additional 4 visits for additional 4 weeks    Patient involved in and agreed to plan of care and goals.  Suggestions for next session as indicated: Progress per plan of care; gait training with cane, stepping exercises, balance       GOALS                                                                                                                           Improve involved strength to 4+/5 (unmet)  The above improvements in impairments to assist in obtaining goals listed below  Long Term Goals: to be met by end of plan of care  1. Patient will complete sit-stand transfer on first attempt without upper extremities with good control for safe independent transfers/ambulation Status: Unmet. Retropulsion when completing without upper extremities   2. Patient will improve Fontaine to 45/56 for independent, safe ambulation. Status: Unmet.  Scored 39/56 today.  3. Patient will improve Dynamic Gait Index to 22/24 for independent, safe ambulation. Status: Unmet.  Scored 11/24 today.      Therapy procedure time and total treatment time  can be found documented on the Time Entry flowsheet   Communicate risk of Fall with Harm to all staff, patient, and family/Provide visual cue: red socks, yellow wristband, yellow gown, etc/Reinforce activity limits and safety measures with patient and family/Bed in lowest position, wheels locked, appropriate side rails in place/Call bell, personal items and telephone in reach/Instruct patient to call for assistance before getting out of bed/chair/stretcher/Non-slip footwear applied when patient is off stretcher/[place] to call system/Physically safe environment - no spills, clutter or unnecessary equipment/Purposeful Proactive Rounding/Room/bathroom lighting operational, light cord in reach

## 2024-03-11 NOTE — H&P ADULT - ASSESSMENT
68 y/o F, FHx early CAD (mother MIs 40s, father CABG 60s), PMHx HTN, HLD, depression, ?CKD Stage III-B (unknown baseline Cr), childhood hx hyaline membrane disease, sent to ED by cardiologist for reported Afib w/ RVR, now rate-controlled, plan for DCCV with EP in AM. 68 y/o F, FHx early CAD (mother MIs 40s, father CABG 60s), PMHx HTN, HLD, Afib (on Eliquis), depression, ?CKD Stage III-B (unknown baseline Cr), childhood hx hyaline membrane disease, sent to ED by cardiologist for reported Afib w/ RVR, now rate-controlled, plan for DCCV with EP in AM. 66 y/o F, FHx early CAD (mother MIs 40s, father CABG 60s), PMHx HTN, HLD, Afib on Eliquis (s/p DCCV x2 in 2023), depression, ?CKD Stage III-B (unknown baseline Cr), childhood hx hyaline membrane disease, sent to ED by cardiologist for reported Afib w/ RVR, now rate-controlled, plan for DCCV with EP in AM.

## 2024-03-11 NOTE — H&P ADULT - NSICDXPASTMEDICALHX_GEN_ALL_CORE_FT
PAST MEDICAL HISTORY:  Afib     History of morbid obesity     HTN (hypertension)     Hyperlipidemia     Prediabetes

## 2024-03-11 NOTE — ED ADULT TRIAGE NOTE - CHIEF COMPLAINT QUOTE
Pt presents with a-fib, suspected a-fib RVR per outpatient cardiologist. Pt went to her scheduled outpatient cardiology appointment for evaluation of known a-fib and the cardiologist reported rapid a-fib. Pt arrives in a-fib with a rate between . Pt denies SOB/chest pain/palpitations/dizziness/weakness/N/V/D. Pt alert, oriented, and ambulatory at her baseline. Pt denies recent fevers, chills, or illnesses. No distress at time of triage.

## 2024-03-11 NOTE — ED PROVIDER NOTE - OBJECTIVE STATEMENT
68 F 68 F w afib presents w serna- px co serna for 11 weeks - states she is mostly homebound due to sob w walking very short distances no orthopnea  today in cards office hr was elevated- sent in for further eval  pmh afib on eliquis metoprolol 50 bid, amlodipine, multaq, losartan  no chest pain  mod severity  no ho dm  no sig allev factors

## 2024-03-11 NOTE — H&P ADULT - PROBLEM SELECTOR PLAN 4
Follow up lipid panel in AM   - Continue: Atorvastatin 80 mg QHS     DVT ppx: Eliquis 5 mg BID   F: _____________   E: Keep K > 4, Mg > 2  N: DASH/TLC w/ CC, NPO pMN for possible DCCV in AM     Code: Full  Dispo: pending clinical progression Follow up lipid panel in AM   - Continue: Atorvastatin 80 mg QHS     DVT ppx: Eliquis 5 mg BID   F: IV NS 75 cc/hr x 5 hours  E: Keep K > 4, Mg > 2  N: DASH/TLC w/ CC, NPO pMN for possible DCCV in AM     Code: Full  Dispo: pending clinical progression

## 2024-03-12 ENCOUNTER — RESULT REVIEW (OUTPATIENT)
Age: 69
End: 2024-03-12

## 2024-03-12 ENCOUNTER — TRANSCRIPTION ENCOUNTER (OUTPATIENT)
Age: 69
End: 2024-03-12

## 2024-03-12 VITALS
SYSTOLIC BLOOD PRESSURE: 94 MMHG | HEART RATE: 72 BPM | DIASTOLIC BLOOD PRESSURE: 60 MMHG | TEMPERATURE: 98 F | RESPIRATION RATE: 19 BRPM

## 2024-03-12 LAB
A1C WITH ESTIMATED AVERAGE GLUCOSE RESULT: 5.5 % — SIGNIFICANT CHANGE UP (ref 4–5.6)
ALBUMIN SERPL ELPH-MCNC: 3.5 G/DL — SIGNIFICANT CHANGE UP (ref 3.3–5)
ALP SERPL-CCNC: 91 U/L — SIGNIFICANT CHANGE UP (ref 40–120)
ALT FLD-CCNC: 12 U/L — SIGNIFICANT CHANGE UP (ref 10–45)
ANION GAP SERPL CALC-SCNC: 12 MMOL/L — SIGNIFICANT CHANGE UP (ref 5–17)
APTT BLD: 31.5 SEC — SIGNIFICANT CHANGE UP (ref 24.5–35.6)
AST SERPL-CCNC: 15 U/L — SIGNIFICANT CHANGE UP (ref 10–40)
BASOPHILS # BLD AUTO: 0.05 K/UL — SIGNIFICANT CHANGE UP (ref 0–0.2)
BASOPHILS NFR BLD AUTO: 0.7 % — SIGNIFICANT CHANGE UP (ref 0–2)
BILIRUB SERPL-MCNC: 1.3 MG/DL — HIGH (ref 0.2–1.2)
BUN SERPL-MCNC: 22 MG/DL — SIGNIFICANT CHANGE UP (ref 7–23)
CALCIUM SERPL-MCNC: 9.1 MG/DL — SIGNIFICANT CHANGE UP (ref 8.4–10.5)
CHLORIDE SERPL-SCNC: 106 MMOL/L — SIGNIFICANT CHANGE UP (ref 96–108)
CHOLEST SERPL-MCNC: 128 MG/DL — SIGNIFICANT CHANGE UP
CO2 SERPL-SCNC: 22 MMOL/L — SIGNIFICANT CHANGE UP (ref 22–31)
CREAT SERPL-MCNC: 1.15 MG/DL — SIGNIFICANT CHANGE UP (ref 0.5–1.3)
EGFR: 52 ML/MIN/1.73M2 — LOW
EOSINOPHIL # BLD AUTO: 0.17 K/UL — SIGNIFICANT CHANGE UP (ref 0–0.5)
EOSINOPHIL NFR BLD AUTO: 2.2 % — SIGNIFICANT CHANGE UP (ref 0–6)
ESTIMATED AVERAGE GLUCOSE: 111 MG/DL — SIGNIFICANT CHANGE UP (ref 68–114)
GLUCOSE SERPL-MCNC: 89 MG/DL — SIGNIFICANT CHANGE UP (ref 70–99)
HCT VFR BLD CALC: 44 % — SIGNIFICANT CHANGE UP (ref 34.5–45)
HDLC SERPL-MCNC: 45 MG/DL — LOW
HGB BLD-MCNC: 14.2 G/DL — SIGNIFICANT CHANGE UP (ref 11.5–15.5)
IMM GRANULOCYTES NFR BLD AUTO: 0.3 % — SIGNIFICANT CHANGE UP (ref 0–0.9)
INR BLD: 1.29 — HIGH (ref 0.85–1.18)
LIPID PNL WITH DIRECT LDL SERPL: 66 MG/DL — SIGNIFICANT CHANGE UP
LYMPHOCYTES # BLD AUTO: 1.37 K/UL — SIGNIFICANT CHANGE UP (ref 1–3.3)
LYMPHOCYTES # BLD AUTO: 18.1 % — SIGNIFICANT CHANGE UP (ref 13–44)
MAGNESIUM SERPL-MCNC: 2.3 MG/DL — SIGNIFICANT CHANGE UP (ref 1.6–2.6)
MCHC RBC-ENTMCNC: 30.2 PG — SIGNIFICANT CHANGE UP (ref 27–34)
MCHC RBC-ENTMCNC: 32.3 GM/DL — SIGNIFICANT CHANGE UP (ref 32–36)
MCV RBC AUTO: 93.6 FL — SIGNIFICANT CHANGE UP (ref 80–100)
MONOCYTES # BLD AUTO: 0.66 K/UL — SIGNIFICANT CHANGE UP (ref 0–0.9)
MONOCYTES NFR BLD AUTO: 8.7 % — SIGNIFICANT CHANGE UP (ref 2–14)
NEUTROPHILS # BLD AUTO: 5.32 K/UL — SIGNIFICANT CHANGE UP (ref 1.8–7.4)
NEUTROPHILS NFR BLD AUTO: 70 % — SIGNIFICANT CHANGE UP (ref 43–77)
NON HDL CHOLESTEROL: 83 MG/DL — SIGNIFICANT CHANGE UP
NRBC # BLD: 0 /100 WBCS — SIGNIFICANT CHANGE UP (ref 0–0)
PLATELET # BLD AUTO: 186 K/UL — SIGNIFICANT CHANGE UP (ref 150–400)
POTASSIUM SERPL-MCNC: 3.7 MMOL/L — SIGNIFICANT CHANGE UP (ref 3.5–5.3)
POTASSIUM SERPL-SCNC: 3.7 MMOL/L — SIGNIFICANT CHANGE UP (ref 3.5–5.3)
PROT SERPL-MCNC: 6.4 G/DL — SIGNIFICANT CHANGE UP (ref 6–8.3)
PROTHROM AB SERPL-ACNC: 14.6 SEC — HIGH (ref 9.5–13)
RBC # BLD: 4.7 M/UL — SIGNIFICANT CHANGE UP (ref 3.8–5.2)
RBC # FLD: 13.4 % — SIGNIFICANT CHANGE UP (ref 10.3–14.5)
SODIUM SERPL-SCNC: 140 MMOL/L — SIGNIFICANT CHANGE UP (ref 135–145)
TRIGL SERPL-MCNC: 85 MG/DL — SIGNIFICANT CHANGE UP
TSH SERPL-MCNC: 5.51 UIU/ML — HIGH (ref 0.27–4.2)
WBC # BLD: 7.59 K/UL — SIGNIFICANT CHANGE UP (ref 3.8–10.5)
WBC # FLD AUTO: 7.59 K/UL — SIGNIFICANT CHANGE UP (ref 3.8–10.5)

## 2024-03-12 PROCEDURE — 85025 COMPLETE CBC W/AUTO DIFF WBC: CPT

## 2024-03-12 PROCEDURE — 80053 COMPREHEN METABOLIC PANEL: CPT

## 2024-03-12 PROCEDURE — 99239 HOSP IP/OBS DSCHRG MGMT >30: CPT

## 2024-03-12 PROCEDURE — 85610 PROTHROMBIN TIME: CPT

## 2024-03-12 PROCEDURE — 84443 ASSAY THYROID STIM HORMONE: CPT

## 2024-03-12 PROCEDURE — 85730 THROMBOPLASTIN TIME PARTIAL: CPT

## 2024-03-12 PROCEDURE — 82553 CREATINE MB FRACTION: CPT

## 2024-03-12 PROCEDURE — 84484 ASSAY OF TROPONIN QUANT: CPT

## 2024-03-12 PROCEDURE — 36415 COLL VENOUS BLD VENIPUNCTURE: CPT

## 2024-03-12 PROCEDURE — 83735 ASSAY OF MAGNESIUM: CPT

## 2024-03-12 PROCEDURE — 80048 BASIC METABOLIC PNL TOTAL CA: CPT

## 2024-03-12 PROCEDURE — 80061 LIPID PANEL: CPT

## 2024-03-12 PROCEDURE — 93306 TTE W/DOPPLER COMPLETE: CPT | Mod: 26

## 2024-03-12 PROCEDURE — 83036 HEMOGLOBIN GLYCOSYLATED A1C: CPT

## 2024-03-12 PROCEDURE — 97161 PT EVAL LOW COMPLEX 20 MIN: CPT

## 2024-03-12 PROCEDURE — 99285 EMERGENCY DEPT VISIT HI MDM: CPT

## 2024-03-12 PROCEDURE — 71045 X-RAY EXAM CHEST 1 VIEW: CPT

## 2024-03-12 PROCEDURE — C8923: CPT

## 2024-03-12 PROCEDURE — 83880 ASSAY OF NATRIURETIC PEPTIDE: CPT

## 2024-03-12 PROCEDURE — 82550 ASSAY OF CK (CPK): CPT

## 2024-03-12 RX ORDER — POTASSIUM CHLORIDE 20 MEQ
40 PACKET (EA) ORAL ONCE
Refills: 0 | Status: COMPLETED | OUTPATIENT
Start: 2024-03-12 | End: 2024-03-12

## 2024-03-12 RX ORDER — DRONEDARONE 400 MG/1
1 TABLET, FILM COATED ORAL
Refills: 0 | DISCHARGE

## 2024-03-12 RX ORDER — METOPROLOL TARTRATE 50 MG
1 TABLET ORAL
Qty: 60 | Refills: 2
Start: 2024-03-12 | End: 2024-06-09

## 2024-03-12 RX ORDER — LOSARTAN POTASSIUM 100 MG/1
1 TABLET, FILM COATED ORAL
Refills: 0 | DISCHARGE

## 2024-03-12 RX ADMIN — SODIUM CHLORIDE 75 MILLILITER(S): 9 INJECTION INTRAMUSCULAR; INTRAVENOUS; SUBCUTANEOUS at 00:02

## 2024-03-12 RX ADMIN — Medication 40 MILLIEQUIVALENT(S): at 11:52

## 2024-03-12 RX ADMIN — APIXABAN 5 MILLIGRAM(S): 2.5 TABLET, FILM COATED ORAL at 17:00

## 2024-03-12 RX ADMIN — Medication 50 MILLIGRAM(S): at 06:34

## 2024-03-12 RX ADMIN — DRONEDARONE 400 MILLIGRAM(S): 400 TABLET, FILM COATED ORAL at 00:16

## 2024-03-12 RX ADMIN — APIXABAN 5 MILLIGRAM(S): 2.5 TABLET, FILM COATED ORAL at 06:34

## 2024-03-12 RX ADMIN — DRONEDARONE 400 MILLIGRAM(S): 400 TABLET, FILM COATED ORAL at 11:52

## 2024-03-12 NOTE — DISCHARGE NOTE PROVIDER - CARE PROVIDERS DIRECT ADDRESSES
,[email or web address]

## 2024-03-12 NOTE — DISCHARGE NOTE PROVIDER - NSDCMRMEDTOKEN_GEN_ALL_CORE_FT
Eliquis 5 mg oral tablet: 1 tab(s) orally 2 times a day  Lipitor 80 mg oral tablet: 1 tab(s) orally once a day (at bedtime)  losartan 25 mg oral tablet: 1 tab(s) orally once a day  Metoprolol Succinate ER 50 mg oral tablet, extended release: 1 tab(s) orally once a day  Multaq 400 mg oral tablet: 1 tab(s) orally every 12 hours  Norvasc 10 mg oral tablet: 1 tab(s) orally once a day   Eliquis 5 mg oral tablet: 1 tab(s) orally 2 times a day  Lipitor 80 mg oral tablet: 1 tab(s) orally once a day (at bedtime)  losartan 25 mg oral tablet: 1 tab(s) orally once a day  metoprolol succinate 50 mg oral tablet, extended release: 1 tab(s) orally 2 times a day  Norvasc 10 mg oral tablet: 1 tab(s) orally once a day   Eliquis 5 mg oral tablet: 1 tab(s) orally 2 times a day  Lipitor 80 mg oral tablet: 1 tab(s) orally once a day (at bedtime)  metoprolol succinate 50 mg oral tablet, extended release: 1 tab(s) orally 2 times a day  Norvasc 10 mg oral tablet: 1 tab(s) orally once a day

## 2024-03-12 NOTE — DISCHARGE NOTE NURSING/CASE MANAGEMENT/SOCIAL WORK - NSDCPEFALRISK_GEN_ALL_CORE
For information on Fall & Injury Prevention, visit: [email or web address] OR  [email or web address] OR  https://www.cdc.gov/steadi/patient.html

## 2024-03-12 NOTE — CONSULT NOTE ADULT - ASSESSMENT
68 y/o F with morbid obesity, h/o HTN, dyslipidemia, AFIB first noted in 5/2023 and had cardioversions (5/2023 and 8/2023). Her AFIB has been persistent since the last cardioversion based on history.  Ventricular rate controlled.   - We discussed risk factors that contribute to her AFIB (weight / ROHAN). She is working on her weight. However, not willing to do CPAP/BiPAP for ROHAN.  Ablation / dccv unlikely be successful given unmodified risk factors.   - Would stop Multaq as her AFIB appears to be persistent based on history.  Check Echo for LVEF.  Continue Toprol, likely titrate up for rate control.   - Continue A/C for stroke prevention.   - pending EP round with DR. Erazo

## 2024-03-12 NOTE — PHYSICAL THERAPY INITIAL EVALUATION ADULT - DIAGNOSIS, PT EVAL
6D: impaired aerobic capacity/endurance associated with cardiovascular pump dysfunction or failure 5A: Primary prevention/risk reduction for loss of balance and falling

## 2024-03-12 NOTE — CONSULT NOTE ADULT - SUBJECTIVE AND OBJECTIVE BOX
Electrophysiology Consult Note:     CHIEF COMPLAINT:  Patient is a 68y old  Female who presents with a chief complaint of afib (12 Mar 2024 13:10)        HISTORY OF PRESENT ILLNESS:   66 y/o F, morbidly obese, with h/o HTN, dyslipidemia, AFIB first noted in 5/2023 and had cardioversions (5/2023 and 8/2023). She was put on Multaq in July 2023. She thought that she went back to AFIB 2 days after the last cardioversion.  She didn't follow up with Dr Erazo or her cardiologist since summer 2023 until yesterday. She saw Dr. Carlos with complaints of SOB / RICHARDS, noted to have AFIB VR 110s and was sent to the ER for further workup.   She admitted to not hydrating well for past few days. Lab showed elevated bun/cr. She was given some IVF in the ER. Her AFIB VR has been controlled since.   She has been on eliquis and multaq uninterrupted.   No CP / dizziness / syncope.   Didn't complete sleep apnea evaluation in the past and is reluctant to have CPAP / BIPAP machine even if she has ROHAN.        Prior studies:   - CTLA (5/16/23): No ALISHA or LA thrombus, dilated LA, coronary artery calcification   - NST (4/2022): normal   - ECHO (3/2017): nl EF 55%, no WMA, no significant valvular disease       PAST MEDICAL & SURGICAL HISTORY:  History of morbid obesity  HTN (hypertension)  Afib  Prediabetes  Hyperlipidemia  No significant past surgical history    FAMILY HISTORY:  FH: CABG (coronary artery bypass surgery) (Father)  FHx: early MI (Mother)    SOCIAL HISTORY:    no etoh / tob / illicit drug    Allergies    morphine (Other)  Darvon (Unknown)  Percocet (Unknown)  horse sera containing agents (Unknown)  sulfADIAZINE (Unknown)  	    MEDICATIONS  (STANDING):  apixaban 5 milliGRAM(s) Oral every 12 hours  atorvastatin 80 milliGRAM(s) Oral at bedtime  influenza  Vaccine (HIGH DOSE) 0.7 milliLiter(s) IntraMuscular once  metoprolol succinate ER 50 milliGRAM(s) Oral daily  sodium chloride 0.9%. 1000 milliLiter(s) (75 mL/Hr) IV Continuous <Continuous>  Multaq 400 mg bid         REVIEW OF SYSTEMS:  CONSTITUTIONAL: No fever, weight loss, or fatigue  EYES: No eye pain, visual disturbances, or discharge  ENMT:  No difficulty hearing, tinnitus, vertigo; No sinus or throat pain  NECK: No pain or stiffness  RESPIRATORY: No cough, wheezing, chills or hemoptysis; + Shortness of Breath with walking.   CARDIOVASCULAR: No chest pain, palpitations, dizziness, or leg swelling  GASTROINTESTINAL: No abdominal or epigastric pain. No nausea, vomiting, or hematemesis; No diarrhea or constipation. No melena or hematochezia.  GENITOURINARY: No dysuria, frequency, hematuria, or incontinence  NEUROLOGICAL: No headaches, memory loss, loss of strength, numbness, or tremors  SKIN: No itching, burning, rashes, or lesions   LYMPH Nodes: No enlarged glands  ENDOCRINE: No heat or cold intolerance; No hair loss  MUSCULOSKELETAL: No joint pain or swelling; No muscle, back, or extremity pain  PSYCHIATRIC: No depression, anxiety, mood swings, or difficulty sleeping  HEME/LYMPH: No easy bruising, or bleeding gums  ALLERY AND IMMUNOLOGIC: No hives or eczema	      PHYSICAL EXAM:  Vital Signs Last 24 Hrs  T(C): 36.2 (12 Mar 2024 11:48), Max: 36.5 (12 Mar 2024 00:10)  T(F): 97.2 (12 Mar 2024 11:48), Max: 97.7 (12 Mar 2024 00:10)  HR: 98 (12 Mar 2024 12:25) (71 - 100)  BP: 127/85 (12 Mar 2024 12:25) (95/66 - 147/60)  BP(mean): 73 (12 Mar 2024 08:19) (73 - 99)  RR: 19 (12 Mar 2024 11:48) (16 - 20)  SpO2: 96% (12 Mar 2024 12:25) (92% - 98%)    Parameters below as of 12 Mar 2024 12:25  Patient On (Oxygen Delivery Method): room air      Daily Height in cm: 160.02 (11 Mar 2024 19:54)    Daily     Constitutional: NAD	  HEENT:   Normal oral mucosa, PERRL, EOMI	  CVS: Normal S1 / S2, irregular, No murmurs  Pulm: CTA. No wheeze or rale  GI:  + BS, soft, NT / ND   Ext: No LE edema  Vascular: Peripheral pulses palpable 2+ bilaterally  Neurologic: A&O x 3, Non-focal  Skin: No rash or lesion       	  LABS:	                         14.2   7.59  )-----------( 186      ( 12 Mar 2024 07:09 )             44.0     03-12    140  |  106  |  22  ----------------------------<  89  3.7   |  22  |  1.15    Ca    9.1      12 Mar 2024 07:09  Mg     2.3     03-12    TPro  6.4  /  Alb  3.5  /  TBili  1.3<H>  /  DBili  x   /  AST  15  /  ALT  12  /  AlkPhos  91  03-12    TSH: Thyroid Stimulating Hormone, Serum: 5.510 uIU/mL (03-12 @ 07:09)  	  EKG:  3/11/24: AFIB, VR 90 bpm. QRS 82ms.   Telemetry: AFIB VR 70-80s.

## 2024-03-12 NOTE — PHYSICAL THERAPY INITIAL EVALUATION ADULT - PERTINENT HX OF CURRENT PROBLEM, REHAB EVAL
68 y/o F, FHx early CAD (mother MIs 40s, father CABG 60s), PMHx HTN, HLD, Afib on Eliquis, depression, ?CKD Stage III-B (unknown baseline Cr), childhood hx hyaline membrane disease, sent to ED by cardiologist for reported Afib w/ RVR, now rate-controlled, plan for DCCV with EP

## 2024-03-12 NOTE — DISCHARGE NOTE PROVIDER - CARE PROVIDER_API CALL
Jaquelin Petersen  Cardiology  [address], [address]  [place] [address]  Phone: [phone]  Fax: [phone]  Established Patient  Follow Up Time: 1 week    Josef Erazo  Cardiac Electrophysiology  [address], [address] [place] [address]  Phone: [phone]  Fax: [phone]  Established Patient  Scheduled Appointment: 04/22/2024 11:40 AM

## 2024-03-12 NOTE — DISCHARGE NOTE PROVIDER - NSDCFUSCHEDAPPT_GEN_ALL_CORE_FT
Josef Erazo  [facility] Physician [facility]  [facility] [address]  Scheduled Appointment: 04/22/2024

## 2024-03-12 NOTE — DISCHARGE NOTE PROVIDER - PROVIDER TOKENS
PROVIDER:[TOKEN:[[id number]],FOLLOWUP:[1 week],ESTABLISHEDPATIENT:[T]],PROVIDER:[TOKEN:[[id number]],SCHEDULEDAPPT:[04/22/2024],SCHEDULEDAPPTTIME:[11:40 AM],ESTABLISHEDPATIENT:[T]]

## 2024-03-12 NOTE — PROVIDER CONTACT NOTE (OTHER) - BACKGROUND
67 year old female history of early CAD, HTN, A-fib, depression, CKD. Sent to ER by cardiologist for A-fib with RVR

## 2024-03-12 NOTE — DISCHARGE NOTE NURSING/CASE MANAGEMENT/SOCIAL WORK - PATIENT PORTAL LINK FT
You can access the FollowMyHealth Patient Portal offered by [facility] by registering at the following website: [email or web address]. By joining [facility]’s FollowMyHealth portal, you will also be able to view your health information using other applications (apps) compatible with our system.

## 2024-03-12 NOTE — PHYSICAL THERAPY INITIAL EVALUATION ADULT - ADDITIONAL COMMENTS
Patient lives alone in elevator accessible apartment with 1 steps to enter. Ambulates with SC at baseline. Patient reports being homebound recently. Denies recent Hx of falls.

## 2024-03-12 NOTE — DISCHARGE NOTE PROVIDER - NSDCCPCAREPLAN_GEN_ALL_CORE_FT
PRINCIPAL DISCHARGE DIAGNOSIS  Diagnosis: Atrial fibrillation  Assessment and Plan of Treatment: You were sent to the ER for fast heart rate. Your fast HR likely secondary to dehydration for which you were treated with IV Fluids. STOP MULTAQ. INCREASE TOPROL XL 50 MG TWICE DAILY. Follow-up with Dr. Carlos in 1 week. Follow-up with Dr. Erazo 4/22/24.      SECONDARY DISCHARGE DIAGNOSES  Diagnosis: Hypertension  Assessment and Plan of Treatment: Please continue Amlodipine 10 mg once daily  as listed to keep your blood pressure controlled. Please STOP LOSARTAN. For blood pressure that is too high or too low please see your doctor or go to the emergency room as necessary.      Diagnosis: JONATHAN (acute kidney injury)  Assessment and Plan of Treatment: You were found to have elevated/increased Creatinine ( a marker of your kidney function) when you presented to the hospital likely due to dehydration. You received IV Fluids and the Creatinine normalized. STOP LOSARTAN

## 2024-03-26 ENCOUNTER — RX RENEWAL (OUTPATIENT)
Age: 69
End: 2024-03-26

## 2024-06-03 ENCOUNTER — RX RENEWAL (OUTPATIENT)
Age: 69
End: 2024-06-03

## 2024-06-03 RX ORDER — AMLODIPINE BESYLATE 10 MG/1
10 TABLET ORAL
Qty: 90 | Refills: 1 | Status: ACTIVE | COMMUNITY
Start: 2022-10-14 | End: 1900-01-01

## 2024-06-04 ENCOUNTER — APPOINTMENT (OUTPATIENT)
Dept: FAMILY MEDICINE | Facility: CLINIC | Age: 69
End: 2024-06-04

## 2024-06-10 ENCOUNTER — APPOINTMENT (OUTPATIENT)
Dept: HEART AND VASCULAR | Facility: CLINIC | Age: 69
End: 2024-06-10
Payer: MEDICARE

## 2024-06-10 VITALS — SYSTOLIC BLOOD PRESSURE: 117 MMHG | DIASTOLIC BLOOD PRESSURE: 71 MMHG | HEIGHT: 63.5 IN | HEART RATE: 85 BPM

## 2024-06-10 PROCEDURE — G2211 COMPLEX E/M VISIT ADD ON: CPT

## 2024-06-10 PROCEDURE — 93000 ELECTROCARDIOGRAM COMPLETE: CPT

## 2024-06-10 PROCEDURE — 99213 OFFICE O/P EST LOW 20 MIN: CPT

## 2024-06-10 RX ORDER — METOPROLOL SUCCINATE 100 MG/1
100 TABLET, EXTENDED RELEASE ORAL DAILY
Qty: 30 | Refills: 5 | Status: ACTIVE | COMMUNITY
Start: 2024-06-10 | End: 1900-01-01

## 2024-06-10 RX ORDER — DRONEDARONE 400 MG/1
400 TABLET, FILM COATED ORAL
Qty: 180 | Refills: 1 | Status: DISCONTINUED | COMMUNITY
Start: 2023-07-17 | End: 2024-06-10

## 2024-06-13 ENCOUNTER — APPOINTMENT (OUTPATIENT)
Dept: HEART AND VASCULAR | Facility: CLINIC | Age: 69
End: 2024-06-13

## 2024-06-17 NOTE — ADDENDUM
[FreeTextEntry1] : I, Pinky Hill, am scribing for and the presence of Dr. Erazo the following sections: HPI, PMH,Family/social history, ROS, Physical Exam, Assessment / Plan.   I, Josef Erazo, personally performed the services described in the documentation, reviewed the documentation recorded by the scribe in my presence and it accurately and completely records my words and actions.

## 2024-06-17 NOTE — DISCUSSION/SUMMARY
[FreeTextEntry1] : Ms. García is a pleasant 68 year-old female with a past medical history significant for Pre-diabetes, HTN, HLD, non-obstructive CAD (normal NST 4/2022), depression and persistent atrial fibrillation s/p DCCV 5/16/23 who presents for follow-up.    1.  AF Recommend Toprol  mg daily for rate control Recommend DCCV followed by ablation and will likely need AAD therapy (Sotalol) thereafter She will consider this and call when she is ready to proceed  2.  Stroke Risk CHADS VASc 3 Continue Eliquis for TE/CVA ppx   3.  F/U 3 months, sooner as needed

## 2024-06-17 NOTE — HISTORY OF PRESENT ILLNESS
[FreeTextEntry1] : Ms. García is a pleasant 68 year-old female with a past medical history significant for Pre-diabetes, HTN, HLD, non-obstructive CAD (normal NST 4/2022), depression and persistent atrial fibrillation s/p DCCV 5/16/23 who presents for follow-up.  Send to ED 3/11/24 for AF with RVR.  Toprol was increased and Multaq discontinued.    She continues to note baseline SOB.  She denies any bleeding issues on OAC.   Reports h/o ROHAN but is not using CPAP.    ECHO 3/2023: EF 55%, no WMA and no significant valvular disease.

## 2024-06-17 NOTE — CARDIOLOGY SUMMARY
[de-identified] : 7/17/23 AF @ 107 bpm  [de-identified] : TTE 3/2024 1. Mild symmetric left ventricular hypertrophy.  2. Low-normal left ventricular systolic function.  3. Mildly reduced right ventricular systolic function.  4. Biatrial enlargement.  5. No significant valvular disease.  6. No evidence of pulmonary hypertension.  7. No pericardial effusion.

## 2024-07-18 ENCOUNTER — APPOINTMENT (OUTPATIENT)
Dept: HEART AND VASCULAR | Facility: CLINIC | Age: 69
End: 2024-07-18

## 2024-07-31 NOTE — ED PROVIDER NOTE - SKIN, MLM
[FreeTextEntry1] : Annual labs reviewed with patient, overall excellent. Discussed diet, exercise, and weight maintenance. Vaccines all UTD. Flu shot in Fall.  Colonoscopy performed in 2023. Acquire report. PSA was normal but slowly trending up, will monitor. HTN - BP controlled. Continue Losartan-HCTZ 100/12.5 mg qd. Continue home BP monitoring.  HLD - LDL is well controlled now 87. Lower Simvastatin to 10 mg daily. Recheck fasting lipid panel in 6 months. Check head ultrasound to assess facial lump along right angle of jaw.  RTO 6 months fasting.  Skin normal color for race, warm, dry and intact. No evidence of rash.

## 2024-08-07 ENCOUNTER — LABORATORY RESULT (OUTPATIENT)
Age: 69
End: 2024-08-07

## 2024-08-07 ENCOUNTER — APPOINTMENT (OUTPATIENT)
Dept: FAMILY MEDICINE | Facility: CLINIC | Age: 69
End: 2024-08-07

## 2024-08-07 PROBLEM — M25.50 JOINT PAIN: Status: ACTIVE | Noted: 2024-08-07

## 2024-08-07 PROBLEM — R32 INCONTINENCE IN FEMALE: Status: ACTIVE | Noted: 2024-08-07

## 2024-08-07 PROBLEM — Z12.11 SCREENING FOR COLON CANCER: Status: ACTIVE | Noted: 2024-08-07

## 2024-08-07 PROCEDURE — 36415 COLL VENOUS BLD VENIPUNCTURE: CPT

## 2024-08-07 PROCEDURE — G0439: CPT

## 2024-08-07 NOTE — HEALTH RISK ASSESSMENT
[No] : In the past 12 months have you used drugs other than those required for medical reasons? No [No falls in past year] : Patient reported no falls in the past year [1] : 2) Feeling down, depressed, or hopeless for several days (1) [PHQ-2 Positive] : PHQ-2 Positive [I have developed a follow-up plan documented below in the note.] : I have developed a follow-up plan documented below in the note. [Alone] : lives alone [Fully functional (bathing, dressing, toileting, transferring, walking, feeding)] : Fully functional (bathing, dressing, toileting, transferring, walking, feeding) [Fully functional (using the telephone, shopping, preparing meals, housekeeping, doing laundry, using] : Fully functional and needs no help or supervision to perform IADLs (using the telephone, shopping, preparing meals, housekeeping, doing laundry, using transportation, managing medications and managing finances) [With Patient/Caregiver] : , with patient/caregiver [Designated Healthcare Proxy] : Designated healthcare proxy [Former] : Former [5-9] : 5-9 [> 15 Years] : > 15 Years [Patient declined colonoscopy] : Patient declined colonoscopy [HIV test declined] : HIV test declined [Hepatitis C test declined] : Hepatitis C test declined [Audit-CScore] : 0 [de-identified] : walking [de-identified] : balanced [GSX2Nrqge] : 2 [Change in mental status noted] : No change in mental status noted [AdvancecareDate] : 08/24

## 2024-08-07 NOTE — REVIEW OF SYSTEMS
[Palpitations] : palpitations [Shortness Of Breath] : shortness of breath [Depression] : depression [Joint Pain] : joint pain [Back Pain] : back pain

## 2024-08-07 NOTE — HISTORY OF PRESENT ILLNESS
[FreeTextEntry1] : Establish care [de-identified] : 70 yo female PMH afib on Eliquis, HLD, CAD, HTN presents to establish care. Patient recently developed afib 1-2 years ago, s/p DCCV 5/16/23 which was unsuccessful, follows with cardiology Dr. Petersen and EPS Dr. Erazo. States since starting afib medications she developed urinary incontinence. Has to wear a pad daily. Denies dysuria, hematuria.  Patient has chronic bilateral knee pain, L worse than R. Patient follows with orthopedist Dr. Camden Loza. Also has back issues, spine xray from 2022 shows multilevel lumbar spondylosis. PT has not been helpful. Patient states she is in pain daily and it is significantly impacting her life. Patient trying to set up home health services, has difficulty getting in touch with programs. Patient states due to her lack of mobility and health conditions she c/o depression symptoms, adopted a cat and reads to help with symptoms. Not interested in talk therapy at this time.  Patient has not seen gynecologist in many years, unknown when last mammogram was. Has stool kit at home for screening purposes.

## 2024-08-07 NOTE — PHYSICAL EXAM
[No Acute Distress] : no acute distress [No Lymphadenopathy] : no lymphadenopathy [Supple] : supple [Thyroid Normal, No Nodules] : the thyroid was normal and there were no nodules present [Normal Rate] : normal rate  [Coordination Grossly Intact] : coordination grossly intact [Normal] : affect was normal and insight and judgment were intact [de-identified] : irregularly irregular [de-identified] : unable to get on exam table to lay flat for examination [de-identified] : walker

## 2024-08-09 PROBLEM — D58.2 ELEVATED HEMOGLOBIN: Status: ACTIVE | Noted: 2024-08-09

## 2024-08-12 ENCOUNTER — NON-APPOINTMENT (OUTPATIENT)
Age: 69
End: 2024-08-12

## 2024-09-21 ENCOUNTER — RX RENEWAL (OUTPATIENT)
Age: 69
End: 2024-09-21

## 2024-10-04 NOTE — PATIENT PROFILE ADULT - NSPROIMPLANTSMEDDEV_GEN_A_NUR
ACE Inhibitor Refill Protocol - 12 Month Protocol Rtiriv25/02/2024 04:05 PM   Protocol Details eGFR resulted within last 12 months -- IF CRITERIA FAILED REFER TO PROTOCOL DETAILS    Seen by prescribing provider or same department within the last 12 months or has a future appt in 3 months - IF FAILED PLEASE LOOK AT CHART REVIEW FOR LAST VISIT AND PROCEED ACCORDINGLY    Normal Potassium within last 12 months looking at last value -- IF CRITERIA FAILED REFER TO PROTOCOL DETAILS    Medication (including dose and sig) on current meds list    Current med list does not contain more than one ACE Inhibitor medication    Last BP was equal to or less than 150/100 -- IF CRITERIA FAILED REFER TO PROTOCOL DETAILS    eGFR greater than 10 resulted within last 12 months looking at last value -- IF CRITERIA FAILED REFER TO PROTOCOL DETAILS        Last ov: 07/02/2024  Last nv bp 09/26/2024  lisinopril (ZESTRIL) 20 MG tablet   
Patient stopped in from pharmacy. States he needs lisinopril filled. Advised pharmacy sent refill request and it can take up to 72 business hours  Patient asked for provider to just do it now. Advised he was seeing patient and re iterated 72 business hours  
None

## 2024-10-18 DIAGNOSIS — R32 UNSPECIFIED URINARY INCONTINENCE: ICD-10-CM

## 2024-10-22 RX ORDER — UNDERPADS
EACH MISCELLANEOUS
Qty: 3 | Refills: 5 | Status: ACTIVE | COMMUNITY
Start: 2024-10-18 | End: 1900-01-01

## 2024-10-22 RX ORDER — PYRANTEL PAMOATE 50 MG/ML
SUSPENSION, ORAL (FINAL DOSE FORM) ORAL
Qty: 3 | Refills: 5 | Status: ACTIVE | COMMUNITY
Start: 2024-10-18 | End: 1900-01-01

## 2024-10-22 RX ORDER — DIPHENHYD/PHENYLEPH/ACETAMINOP 12.5-5-325
TABLET ORAL
Qty: 3 | Refills: 1 | Status: ACTIVE | COMMUNITY
Start: 2024-10-18 | End: 1900-01-01

## 2024-10-22 RX ORDER — ETOH/EUC OIL/MENTH/PEP/WINTERG
SPRAY, NON-AEROSOL (ML) MUCOUS MEMBRANE
Qty: 3 | Refills: 3 | Status: ACTIVE | COMMUNITY
Start: 2024-10-18 | End: 1900-01-01

## 2024-12-03 ENCOUNTER — RX RENEWAL (OUTPATIENT)
Age: 69
End: 2024-12-03

## 2024-12-13 ENCOUNTER — NON-APPOINTMENT (OUTPATIENT)
Age: 69
End: 2024-12-13

## 2025-01-02 ENCOUNTER — RX RENEWAL (OUTPATIENT)
Age: 70
End: 2025-01-02

## 2025-01-15 NOTE — ED ADULT TRIAGE NOTE - PAIN: PRESENCE, MLM
Patient here c/o PMB.   Reviewed medical, surgical, social and family history.  Also reviewed current medications and allergies.  Noted spotting before [place] with wiping.  States she was straining with BM with noted.  States saw spotting approximately 5 times.  No VB x 2 weeks.  Patient denies new sexual partners or abnormal vaginal discharge.  No recent blood thinners or steroid injections.  Denies new or changed meds.  Denies trauma.  No major weight changes or increase in stress.  Ordered labs, urine, pelvic US and will F/U for results and Endosee with [facility].  All questions answered.         Vitals:  /70   Ht 1.626 m (5' 4\")   Wt 72.1 kg (159 lb)   LMP 05/26/2021   BMI 27.29 kg/m²   Past Medical History:   Diagnosis Date    Abnormal Pap smear of cervix     Acute myocardial infarction (HCC) 08/25/2021    CAD (coronary artery disease)     Diverticular disease     Diverticulitis      Past Surgical History:   Procedure Laterality Date    CARDIAC SURGERY      stents (2)    DILATION AND CURETTAGE       Allergies:  Sulfa antibiotics  Current Outpatient Medications   Medication Sig Dispense Refill    carvedilol (COREG) 3.125 MG tablet TAKE 1 TABLET BY MOUTH TWICE DAILY 180 tablet 3    nitroGLYCERIN (NITROSTAT) 0.4 MG SL tablet up to max of 3 total doses. If no relief after 1 dose, call 911. 25 tablet 3    lisinopril (PRINIVIL;ZESTRIL) 5 MG tablet Take 1 tablet by mouth daily 90 tablet 3    clopidogrel (PLAVIX) 75 MG tablet Take 1 tablet by mouth daily 90 tablet 3    atorvastatin (LIPITOR) 80 MG tablet Take 1 tablet by mouth at bedtime 90 tablet 3    B Complex CAPS Take 1 capsule by mouth daily 90 capsule 3    ascorbic acid (VITAMIN C) 500 MG tablet Take 1 tablet by mouth daily 90 tablet 3    ASPIRIN LOW DOSE 81 MG chewable tablet CHEW AND SWALLOW 1 (ONE) TABLET BY MOUTH ONCE DAILY 90 tablet 3    acetaminophen (TYLENOL) 500 MG tablet Take 2 tablets by mouth every 6 hours as needed for Pain or Fever 60 tablet 0 
The patient was asked if she would like a chaperone present for her intimate exam. She  Declined the chaperone. Aurelio Ruiz CMA (AAMA)    
denies pain/discomfort (Rating = 0)

## 2025-02-18 ENCOUNTER — APPOINTMENT (OUTPATIENT)
Dept: ORTHOPEDIC SURGERY | Facility: CLINIC | Age: 70
End: 2025-02-18

## 2025-03-25 ENCOUNTER — RX RENEWAL (OUTPATIENT)
Age: 70
End: 2025-03-25

## 2025-06-26 ENCOUNTER — RX RENEWAL (OUTPATIENT)
Age: 70
End: 2025-06-26

## 2025-07-07 ENCOUNTER — RX RENEWAL (OUTPATIENT)
Age: 70
End: 2025-07-07